# Patient Record
Sex: FEMALE | Race: WHITE | NOT HISPANIC OR LATINO | Employment: STUDENT | URBAN - METROPOLITAN AREA
[De-identification: names, ages, dates, MRNs, and addresses within clinical notes are randomized per-mention and may not be internally consistent; named-entity substitution may affect disease eponyms.]

---

## 2018-09-05 ENCOUNTER — OFFICE VISIT (OUTPATIENT)
Dept: NEUROLOGY | Facility: CLINIC | Age: 28
End: 2018-09-05
Payer: OTHER GOVERNMENT

## 2018-09-05 VITALS
HEART RATE: 72 BPM | SYSTOLIC BLOOD PRESSURE: 110 MMHG | BODY MASS INDEX: 26.51 KG/M2 | DIASTOLIC BLOOD PRESSURE: 70 MMHG | HEIGHT: 71 IN | WEIGHT: 189.38 LBS

## 2018-09-05 DIAGNOSIS — F43.20 ADJUSTMENT DISORDER, UNSPECIFIED TYPE: ICD-10-CM

## 2018-09-05 DIAGNOSIS — G43.009 MIGRAINE WITHOUT AURA AND WITHOUT STATUS MIGRAINOSUS, NOT INTRACTABLE: Primary | ICD-10-CM

## 2018-09-05 PROCEDURE — 99999 PR PBB SHADOW E&M-EST. PATIENT-LVL IV: CPT | Mod: PBBFAC,,, | Performed by: NURSE PRACTITIONER

## 2018-09-05 PROCEDURE — 99214 OFFICE O/P EST MOD 30 MIN: CPT | Mod: S$PBB,,, | Performed by: NURSE PRACTITIONER

## 2018-09-05 PROCEDURE — 99214 OFFICE O/P EST MOD 30 MIN: CPT | Mod: PBBFAC | Performed by: NURSE PRACTITIONER

## 2018-09-05 RX ORDER — VERAPAMIL HYDROCHLORIDE 40 MG/1
40 TABLET ORAL DAILY
COMMUNITY
End: 2018-09-05 | Stop reason: ALTCHOICE

## 2018-09-05 RX ORDER — RIZATRIPTAN BENZOATE 5 MG/1
5 TABLET ORAL
COMMUNITY
End: 2019-01-16

## 2018-09-05 RX ORDER — ESCITALOPRAM OXALATE 10 MG/1
10 TABLET ORAL DAILY
Qty: 30 TABLET | Refills: 2 | Status: SHIPPED | OUTPATIENT
Start: 2018-09-05 | End: 2018-10-02

## 2018-09-05 RX ORDER — RIZATRIPTAN BENZOATE 5 MG/1
5 TABLET, ORALLY DISINTEGRATING ORAL
Qty: 9 TABLET | Refills: 5 | Status: SHIPPED | OUTPATIENT
Start: 2018-09-05 | End: 2018-10-05

## 2018-09-05 NOTE — PROGRESS NOTES
"Ailyn Sapp returns today for mgt of headaches. Last seen 11/15/16    HISTORY  9/27/16:   REASON FOR CONSULT: Headaches     26/F with chronic headaches since HS (16). They were infrequent then and treated effectively with Advil prn. Since age 19 they worsened in intensity and frequency. She was seen at [facility] (lived in [place]) at that time and was given 5 prescriptions but she never began any. Headaches (HA) were more frequent prior to pregnancy ~ age 23. They resolved and so did sinus congestion throughout pregnancy then 9-mos after delivery they returned again, but not as frequent as they once were. She will occasionally awaken with pain. HA are gradual or at times abrupt onset if triggered by strong scent. Bright lights, stress and menses other triggers. Describes as R or left sided posterior area radiating bitemporally, pulsatile sensation "when bad with ping or zing pain" with associated nausea, floaters, photo/phonophobia and dec'd appetite. She has had visual auras(zigzag lines) in distant past. Awoke 8/30/16 with worse head pain ever like head was going to explode/excuciating along with fever/chills/sweating. Other symptoms improved but headache lingered for 4-5 d. She was not taking any OTC analgesics. CT scan head essentially unremarkable and labs nl except elevated liver enzymes. Since then, headache have occurred ~ 1-2x/week, mild-moderate, which is typical frequency. She denies acute neurological or unilateral autonomic deficit, dizziness, or prodromal symptoms. Activity can worsen pain, rest/dark room/cold compress improve. HIT-6= 68    Never been on preventive therapy or tried RX abortives except recent Fioricet (found it inc'd headaches during her recent severe episode 4-5 d)      INTERVAL HISTORY:  11/15/16: Since last seen, headaches have lessened in frequency, now about 1 q 2 wks. Imitrex 100mg 1/2 tab very effective w/i 30-40min. Has been taking topamax 15mg only prn, not daily. " "Definitely helping with anxiety (witnessed code in clinical last week). But still feels wired and very anxious inside. Moving to [place] and graduating in 3 wks. HIT-6 = 55  Interim: blepharitis, on doxy    9/5/18:   She stopped topamax ~ mid 2017 when she established care with neurologist in [place]. Began Gabapentin 300mg bid but stopped this spring '18 due to 30# gain. She is now taking verapamil 40mg qd and reports HA occur 1-2x/month. She does not like being on a BP med. Her anxiety is biggest reason for visit today. 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  deployed and now living with in-laws here in [place]. She has felt R sided CP 4x in last 3mos she attributes to anxiety. She takes Mag 250mg qhs. Maxalt ODT 5mg and Zofran prn are effective. HA remains same location/nature as above (9/27/16).     TRIED( cymbalta, topamax, verapamil, gabapentin)      FH: negative for headache  SH:Cardiovascular sonography/hoping to begin at [facility]        ROS: In addition to above. Otherwise a balance review of 10-systems is negative.       PHYSICAL EXAM:   General: W/D, obese, well groomed  /70   Pulse 72   Ht 5' 11" (1.803 m)   Wt 85.9 kg (189 lb 6 oz)   BMI 26.41 kg/m²   Neurological: Awake, alert, oriented x 3. Speech fluent, no dysarthria. Good attention span. Good fund of knowledge.   CN II-XII- pupils equal, no ptosis, nystagmus, EOM palsy, papilledema. No facial asymmetry or facial sensory loss. Good hearing bilaterally. Good shoulder shrug, palatal elevation, tongue protrusion bilaterally.   Motor: 5/5 strength, normal tone/bulk in all extremities.   Sensory: Intact to light touch upper and lower extremities. No occipital or paracervical muscle tenderness.   Gait: Normal   Coordination: Good FTNT, Heel to shin         IMPRESSION:   Chronic migraines with and w/o aura-stable  Anxiety      PLAN   Stop verapamil (if worsen " consider Aimovig- discussed today)  REFER to psychiatry  Begin lexapro 10mg qd (discussed s/e, if sedating then take qhs)  Continue meditation/deep breathing and mindfulness exercises and healthy diet    Continue maxalt ODT 5mg q2h prn  Continue zofran prn HA or nausea    RTC 3-mos, sooner if needed.

## 2018-09-05 NOTE — PATIENT INSTRUCTIONS
Treating Anxiety Disorders with Medicine  An anxiety disorder can make you feel nervous or apprehensive, even without a clear reason. In people age 65 and older, generalized anxiety disorder is one of the most commonly diagnosed anxiety disorders. Many times it occurs with depression. Certain anxiety disorders can cause intense feelings of fear or panic. You may even have physical symptoms such as a racing heartbeat, sweating, or dizziness. If you have these feelings, you dont have to suffer anymore. Treatment to help you overcome your fears will likely include therapy (also called counseling). Medicine may also be prescribed to help control your symptoms.    Medicines  Certain medicines may be prescribed to help control your symptoms. So you may feel less anxious. You may also feel able to move forward with therapy. At first, medicines and dosages may need to be adjusted to find what works best for you. Try to be patient. Tell your healthcare provider how a medicine makes you feel. This way, you can work together to find the treatment thats best for you. Keep in mind that medicines can have side effects. Talk with your provider about any side effects that are bothering you. Changing the dose or type of medicine may help. Dont stop taking medicine on your own. That can cause symptoms to come back.  · Anti-anxiety medicine. This medicine eases symptoms and helps you relax. Your healthcare provider will explain when and how to use it. It may be prescribed for use before situations that make you anxious. You may also be told to take medicine on a regular schedule. Anti-anxiety medicine may make you feel a little sleepy or out of it. Dont drive a car or operate machinery while on this medicine, until you know how it affects you.  Caution  Never use alcohol or other drugs with anti-anxiety medicines. This could result in loss of muscular control, sedation, coma, or death. Also, use only the amount of medicine  prescribed for you. If you think you may have taken too much, get emergency care right away.   · Antidepressant medicine. This kind of medicine is often used to treat anxiety, even if you arent depressed. An antidepressant helps balance out brain chemicals. This helps keep anxiety under control. This medicine is taken on a schedule. It takes a few weeks to start working. If you dont notice a change at first, you may just need more time. But if you dont notice results after the first few weeks, tell your provider.  Keep taking medicines as prescribed  Never change your dosage, share or use another person's medicine, or stop taking your medicines without talking to your healthcare provider first. Keep the following in mind:  · Some medicines must be taken on a schedule. Make this part of your daily routine. For instance, always take your pill before brushing your teeth. A pillbox can help you remember if youve taken your medicine each day.  · Medicines are often taken for 6 to 12 months. Your healthcare provider will then evaluate whether you need to stay on them. Many people who have also had therapy may no longer need medicine to manage anxiety.  · You may need to stop taking medicine slowly to give your body time to adjust. When its time to stop, your healthcare provider will tell you more. Remember: Never stop taking your medicine without talking to your provider first.  · If symptoms return, you may need to start taking medicines again. This isnt your fault. Its just the nature of your anxiety disorder.  Special concerns  · Side effects. Medicines may cause side effects. Ask your healthcare provider or pharmacist what you can expect. They may have ideas for avoiding some side effects.  · Sexual problems. Some antidepressants can affect your desire for sex or your ability to have an orgasm. A change in dosage or medicine often solves the problem. If you have a sexual side effect that concerns you, tell your  healthcare provider.  · Addiction. If youve never had a problem with drugs or alcohol, you may not have a problem with medicines used to treat anxiety disorders. But always discuss the medicines with your healthcare provider before taking them. If you have a history of addiction, you may not be able to use certain medicines used to treat anxiety disorders.  · Medicine interactions. Always check with your pharmacist before using any over-the-counter medicines, including herbal supplements.   Date Last Reviewed: 5/1/2017 © 2000-2017 [employer]. [address] [place] [address]. All rights reserved. This information is not intended as a substitute for professional medical care. Always follow your healthcare professional's instructions.        Understanding Generalized Anxiety Disorder (WINDY)  Anxiety can fill you with worry and fear. Sometimes anxiety is healthy. It alerts you to a potential threat and gets you to respond and take action. But for some people, anxiety gets so bad it causes problems in daily life. If you find yourself in a constant state of anxiety, you may have an anxiety disorder called generalized anxiety disorder (WINDY). Speak with your healthcare provider or mental health professional to learn more. He or she can help.     What is generalized anxiety disorder?  With WINDY, you might worry about money, your family and friends, work, or the world in general. You might not even be sure what you're anxious about. But whatever it is, you have an intense fear that the worst will happen. These feelings never really go away. In people age 65 and older, WINDY is one of the most commonly diagnosed anxiety disorders.  Many times it occurs with depression. This constant worry affects your quality of life and makes it hard to function. WINDY can cause physical symptoms, too.  What are common symptoms of generalized anxiety disorder?  People with WINDY often think they have a physical illness. The  disorder can cause symptoms, such as:  · Muscle tension, especially in the neck and shoulders  · Nausea and stomach problems  · Frequent headaches  · Feeling lightheaded  · Restlessness, trouble sleeping  · Feeling irritable and on edge all the time  How can generalized anxiety disorder be treated?  WINDY can be treated with medicine or therapy (also called counseling), or both. Medicine helps to reduce symptoms, so you can continue with your daily routine. Therapy helps you understand the cause of your anxiety and learn how to manage it. Both forms of treatment help you deal with problems that anxiety causes in your life. This helps you to be healthier and happier.  Date Last Reviewed: 5/1/2017  © 5338-2968 [employer]. [address] [place] [address], [place] [address]. All rights reserved. This information is not intended as a substitute for professional medical care. Always follow your healthcare professional's instructions.

## 2018-10-02 ENCOUNTER — HOSPITAL ENCOUNTER (OUTPATIENT)
Dept: CARDIOLOGY | Facility: CLINIC | Age: 28
Discharge: HOME | End: 2018-10-02
Payer: OTHER GOVERNMENT

## 2018-10-02 ENCOUNTER — OFFICE VISIT (OUTPATIENT)
Dept: PSYCHIATRY | Facility: CLINIC | Age: 28
End: 2018-10-02
Payer: OTHER GOVERNMENT

## 2018-10-02 VITALS
WEIGHT: 189.13 LBS | HEIGHT: 70 IN | HEART RATE: 66 BPM | SYSTOLIC BLOOD PRESSURE: 113 MMHG | DIASTOLIC BLOOD PRESSURE: 66 MMHG | BODY MASS INDEX: 27.08 KG/M2

## 2018-10-02 DIAGNOSIS — F32.4 MDD (MAJOR DEPRESSIVE DISORDER), SINGLE EPISODE, IN PARTIAL REMISSION: ICD-10-CM

## 2018-10-02 DIAGNOSIS — F40.10 SOCIAL ANXIETY DISORDER: ICD-10-CM

## 2018-10-02 DIAGNOSIS — F51.05 INSOMNIA DUE TO OTHER MENTAL DISORDER: ICD-10-CM

## 2018-10-02 DIAGNOSIS — F99 INSOMNIA DUE TO OTHER MENTAL DISORDER: ICD-10-CM

## 2018-10-02 DIAGNOSIS — F41.1 GAD (GENERALIZED ANXIETY DISORDER): Primary | ICD-10-CM

## 2018-10-02 DIAGNOSIS — F41.1 GAD (GENERALIZED ANXIETY DISORDER): ICD-10-CM

## 2018-10-02 PROCEDURE — 93005 ELECTROCARDIOGRAM TRACING: CPT | Mod: PBBFAC | Performed by: INTERNAL MEDICINE

## 2018-10-02 PROCEDURE — 99203 OFFICE O/P NEW LOW 30 MIN: CPT | Mod: S$PBB,,, | Performed by: STUDENT IN AN ORGANIZED HEALTH CARE EDUCATION/TRAINING PROGRAM

## 2018-10-02 PROCEDURE — 93010 ELECTROCARDIOGRAM REPORT: CPT | Mod: S$PBB,,, | Performed by: INTERNAL MEDICINE

## 2018-10-02 PROCEDURE — 99212 OFFICE O/P EST SF 10 MIN: CPT | Mod: PBBFAC | Performed by: STUDENT IN AN ORGANIZED HEALTH CARE EDUCATION/TRAINING PROGRAM

## 2018-10-02 PROCEDURE — 99999 PR PBB SHADOW E&M-EST. PATIENT-LVL II: CPT | Mod: PBBFAC,,, | Performed by: STUDENT IN AN ORGANIZED HEALTH CARE EDUCATION/TRAINING PROGRAM

## 2018-10-02 RX ORDER — ESCITALOPRAM OXALATE 20 MG/1
20 TABLET ORAL DAILY
Qty: 30 TABLET | Refills: 2 | Status: SHIPPED | OUTPATIENT
Start: 2018-10-02 | End: 2018-11-07

## 2018-10-02 RX ORDER — DOXEPIN HYDROCHLORIDE 10 MG/1
10 CAPSULE ORAL NIGHTLY PRN
Qty: 30 CAPSULE | Refills: 2 | Status: SHIPPED | OUTPATIENT
Start: 2018-10-02 | End: 2018-10-24

## 2018-10-02 RX ORDER — HYDROXYZINE PAMOATE 25 MG/1
CAPSULE ORAL
Qty: 30 CAPSULE | Refills: 2 | Status: SHIPPED | OUTPATIENT
Start: 2018-10-02 | End: 2019-01-09 | Stop reason: SDUPTHER
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
## 2018-10-03 ENCOUNTER — PATIENT MESSAGE (OUTPATIENT)
Dept: PSYCHIATRY | Facility: CLINIC | Age: 28
End: 2018-10-03

## 2018-10-12 NOTE — PROGRESS NOTES
Subjective:       Patient ID: Ailyn Sapp is a 28 y.o. female.    Chief Complaint: Fatigue    Patient is a 28 y.o.female who presents today for annual. Has been feeling sluggish.    Having a weight problem. Gaining weight despite exercising twice per day and dieting.    Cholesterol: due now  Vaccines: up to date  Gyn exam: due now  Exercise: she does crossfit 3-4 days per week and spinning 5-6.   Diet: strict; no soda, no fast food, high protein, low fat diet      Two months ago; got established with a psychiatrist. She is currently taking lexapro.     Hasn't taken vitamin D since July. She was taking twice a week vitamin D 50,000 units.   Past Medical History:   Diagnosis Date    Abnormal Pap smear of cervix     Migraines      Past Surgical History:   Procedure Laterality Date    CHOLECYSTECTOMY      diagnostic laparoscopy      laproscopy N/A     2011    STRABISMUS SURGERY Bilateral      Social History     Socioeconomic History    Marital status:      Spouse name: Not on file    Number of children: Not on file    Years of education: Not on file    Highest education level: Not on file   Social Needs    Financial resource strain: Not on file    Food insecurity - worry: Not on file    Food insecurity - inability: Not on file    Transportation needs - medical: Not on file    Transportation needs - non-medical: Not on file   Occupational History    Not on file   Tobacco Use    Smoking status: Never Smoker    Smokeless tobacco: Never Used   Substance and Sexual Activity    Alcohol use: No     Frequency: Never    Drug use: No    Sexual activity: Yes     Partners: Male   Other Topics Concern    Not on file   Social History Narrative    Not on file     Review of patient's allergies indicates:   Allergen Reactions    Ofloxacin Itching     Eye drops produced allergic conjunctivitis.     Ailyn Sapp had no medications administered during this visit.    Review of Systems    Constitutional: Negative for appetite change, chills, diaphoresis, fatigue and fever.   HENT: Negative for congestion, dental problem, ear discharge, ear pain, hearing loss, postnasal drip, sinus pressure and sore throat.    Eyes: Negative for discharge, redness and itching.   Respiratory: Negative for cough, chest tightness, shortness of breath and wheezing.    Cardiovascular: Negative for chest pain, palpitations and leg swelling.   Gastrointestinal: Negative for abdominal pain, constipation, diarrhea, nausea and vomiting.   Endocrine: Negative for cold intolerance and heat intolerance.   Genitourinary: Negative for difficulty urinating, frequency, hematuria and urgency.   Musculoskeletal: Negative for arthralgias, back pain, gait problem, myalgias and neck pain.   Skin: Negative for color change and rash.   Neurological: Negative for dizziness, syncope and headaches.   Hematological: Negative for adenopathy.   Psychiatric/Behavioral: Negative for behavioral problems and sleep disturbance. The patient is not nervous/anxious.        Objective:      Physical Exam   Constitutional: She is oriented to person, place, and time. She appears well-developed and well-nourished. No distress.   HENT:   Head: Normocephalic and atraumatic.   Right Ear: External ear normal.   Left Ear: External ear normal.   Nose: Nose normal.   Mouth/Throat: Oropharynx is clear and moist. No oropharyngeal exudate.   Eyes: Conjunctivae and EOM are normal. Pupils are equal, round, and reactive to light. Right eye exhibits no discharge. Left eye exhibits no discharge. No scleral icterus.   Neck: Normal range of motion. Neck supple. No JVD present. No thyromegaly present.   Cardiovascular: Normal rate, regular rhythm, normal heart sounds and intact distal pulses. Exam reveals no gallop and no friction rub.   No murmur heard.  Pulmonary/Chest: Effort normal and breath sounds normal. No stridor. No respiratory distress. She has no wheezes. She has  no rales. She exhibits no tenderness.   Abdominal: Soft. Bowel sounds are normal. She exhibits no distension. There is no tenderness. There is no rebound.   Musculoskeletal: Normal range of motion. She exhibits no edema or tenderness.   Lymphadenopathy:     She has no cervical adenopathy.   Neurological: She is alert and oriented to person, place, and time. No cranial nerve deficit.   Skin: Skin is warm and dry. No rash noted. She is not diaphoretic. No erythema.   Psychiatric: She has a normal mood and affect. Her behavior is normal.   Nursing note and vitals reviewed.      Assessment and Plan:       1. Annual physical exam  - CBC auto differential; Future  - Comprehensive metabolic panel; Future  - Lipid panel; Future  - Vitamin D; Future  - Urinalysis; Future  - TSH; Future  - THYROID PEROXIDASE ANTIBODY; Future  - T4, free; Future    2. Vitamin D deficiency  - check vit D    3. Elevated LFTs  - check cmp    4. Overweight (BMI 25.0-29.9)  - Ambulatory Referral to Bariatric Medicine          No Follow-up on file.

## 2018-10-16 ENCOUNTER — PATIENT MESSAGE (OUTPATIENT)
Dept: PSYCHIATRY | Facility: CLINIC | Age: 28
End: 2018-10-16

## 2018-10-23 ENCOUNTER — PATIENT MESSAGE (OUTPATIENT)
Dept: PSYCHIATRY | Facility: CLINIC | Age: 28
End: 2018-10-23

## 2018-10-24 RX ORDER — DOXEPIN HYDROCHLORIDE 10 MG/1
20 CAPSULE ORAL NIGHTLY PRN
Qty: 60 CAPSULE | Refills: 2 | Status: SHIPPED | OUTPATIENT
Start: 2018-10-24 | End: 2018-11-07

## 2018-10-24 NOTE — TELEPHONE ENCOUNTER
Pt doing better with doxepin 20mg nightly, needs refill so as not to run out.   Doxepin 10mg caps #60, 2 refills, sent to pharmacy.

## 2018-10-26 ENCOUNTER — OFFICE VISIT (OUTPATIENT)
Dept: INTERNAL MEDICINE | Facility: CLINIC | Age: 28
End: 2018-10-26
Payer: OTHER GOVERNMENT

## 2018-10-26 VITALS
SYSTOLIC BLOOD PRESSURE: 113 MMHG | DIASTOLIC BLOOD PRESSURE: 75 MMHG | WEIGHT: 198.44 LBS | RESPIRATION RATE: 16 BRPM | HEIGHT: 70 IN | BODY MASS INDEX: 28.41 KG/M2 | TEMPERATURE: 99 F | HEART RATE: 87 BPM

## 2018-10-26 DIAGNOSIS — Z00.00 ANNUAL PHYSICAL EXAM: Primary | ICD-10-CM

## 2018-10-26 DIAGNOSIS — E66.3 OVERWEIGHT (BMI 25.0-29.9): ICD-10-CM

## 2018-10-26 DIAGNOSIS — R79.89 ELEVATED LFTS: ICD-10-CM

## 2018-10-26 DIAGNOSIS — E55.9 VITAMIN D DEFICIENCY: ICD-10-CM

## 2018-10-26 PROCEDURE — 99999 PR PBB SHADOW E&M-EST. PATIENT-LVL III: CPT | Mod: PBBFAC,,, | Performed by: INTERNAL MEDICINE

## 2018-10-26 PROCEDURE — 99395 PREV VISIT EST AGE 18-39: CPT | Mod: S$PBB,,, | Performed by: INTERNAL MEDICINE

## 2018-10-26 PROCEDURE — 99213 OFFICE O/P EST LOW 20 MIN: CPT | Mod: PBBFAC,PO | Performed by: INTERNAL MEDICINE

## 2018-10-27 ENCOUNTER — LAB VISIT (OUTPATIENT)
Dept: LAB | Facility: HOSPITAL | Age: 28
End: 2018-10-27
Attending: INTERNAL MEDICINE
Payer: OTHER GOVERNMENT

## 2018-10-27 DIAGNOSIS — Z00.00 ANNUAL PHYSICAL EXAM: ICD-10-CM

## 2018-10-27 LAB
25(OH)D3+25(OH)D2 SERPL-MCNC: 20 NG/ML
ALBUMIN SERPL BCP-MCNC: 4.3 G/DL
ALP SERPL-CCNC: 84 U/L
ALT SERPL W/O P-5'-P-CCNC: 80 U/L
ANION GAP SERPL CALC-SCNC: 8 MMOL/L
AST SERPL-CCNC: 42 U/L
BASOPHILS # BLD AUTO: 0.04 K/UL
BASOPHILS NFR BLD: 0.6 %
BILIRUB SERPL-MCNC: 1 MG/DL
BUN SERPL-MCNC: 10 MG/DL
CALCIUM SERPL-MCNC: 10 MG/DL
CHLORIDE SERPL-SCNC: 106 MMOL/L
CHOLEST SERPL-MCNC: 175 MG/DL
CHOLEST/HDLC SERPL: 5.1 {RATIO}
CO2 SERPL-SCNC: 24 MMOL/L
CREAT SERPL-MCNC: 0.9 MG/DL
DIFFERENTIAL METHOD: NORMAL
EOSINOPHIL # BLD AUTO: 0.2 K/UL
EOSINOPHIL NFR BLD: 2.8 %
ERYTHROCYTE [DISTWIDTH] IN BLOOD BY AUTOMATED COUNT: 13.1 %
EST. GFR  (AFRICAN AMERICAN): >60 ML/MIN/1.73 M^2
EST. GFR  (NON AFRICAN AMERICAN): >60 ML/MIN/1.73 M^2
GLUCOSE SERPL-MCNC: 96 MG/DL
HCT VFR BLD AUTO: 45.8 %
HDLC SERPL-MCNC: 34 MG/DL
HDLC SERPL: 19.4 %
HGB BLD-MCNC: 14.8 G/DL
LDLC SERPL CALC-MCNC: 123.2 MG/DL
LYMPHOCYTES # BLD AUTO: 2 K/UL
LYMPHOCYTES NFR BLD: 29.5 %
MCH RBC QN AUTO: 30.3 PG
MCHC RBC AUTO-ENTMCNC: 32.3 G/DL
MCV RBC AUTO: 94 FL
MONOCYTES # BLD AUTO: 0.5 K/UL
MONOCYTES NFR BLD: 7.9 %
NEUTROPHILS # BLD AUTO: 3.9 K/UL
NEUTROPHILS NFR BLD: 58.9 %
NONHDLC SERPL-MCNC: 141 MG/DL
PLATELET # BLD AUTO: 253 K/UL
PMV BLD AUTO: 10.8 FL
POTASSIUM SERPL-SCNC: 4.5 MMOL/L
PROT SERPL-MCNC: 7.9 G/DL
RBC # BLD AUTO: 4.88 M/UL
SODIUM SERPL-SCNC: 138 MMOL/L
T4 FREE SERPL-MCNC: 0.88 NG/DL
TRIGL SERPL-MCNC: 89 MG/DL
TSH SERPL DL<=0.005 MIU/L-ACNC: 1.27 UIU/ML
WBC # BLD AUTO: 6.67 K/UL

## 2018-10-27 PROCEDURE — 85025 COMPLETE CBC W/AUTO DIFF WBC: CPT

## 2018-10-27 PROCEDURE — 80061 LIPID PANEL: CPT

## 2018-10-27 PROCEDURE — 80053 COMPREHEN METABOLIC PANEL: CPT

## 2018-10-27 PROCEDURE — 84443 ASSAY THYROID STIM HORMONE: CPT

## 2018-10-27 PROCEDURE — 36415 COLL VENOUS BLD VENIPUNCTURE: CPT

## 2018-10-27 PROCEDURE — 86376 MICROSOMAL ANTIBODY EACH: CPT

## 2018-10-27 PROCEDURE — 84439 ASSAY OF FREE THYROXINE: CPT

## 2018-10-27 PROCEDURE — 82306 VITAMIN D 25 HYDROXY: CPT

## 2018-10-28 ENCOUNTER — PATIENT MESSAGE (OUTPATIENT)
Dept: INTERNAL MEDICINE | Facility: CLINIC | Age: 28
End: 2018-10-28

## 2018-10-29 LAB — THYROPEROXIDASE IGG SERPL-ACNC: <6 IU/ML

## 2018-10-30 ENCOUNTER — PATIENT MESSAGE (OUTPATIENT)
Dept: INTERNAL MEDICINE | Facility: CLINIC | Age: 28
End: 2018-10-30

## 2018-11-07 ENCOUNTER — OFFICE VISIT (OUTPATIENT)
Dept: PSYCHIATRY | Facility: CLINIC | Age: 28
End: 2018-11-07
Payer: OTHER GOVERNMENT

## 2018-11-07 VITALS
HEART RATE: 91 BPM | WEIGHT: 198.75 LBS | BODY MASS INDEX: 28.45 KG/M2 | SYSTOLIC BLOOD PRESSURE: 124 MMHG | DIASTOLIC BLOOD PRESSURE: 66 MMHG | HEIGHT: 70 IN

## 2018-11-07 DIAGNOSIS — F51.05 INSOMNIA DUE TO OTHER MENTAL DISORDER: ICD-10-CM

## 2018-11-07 DIAGNOSIS — F40.10 SOCIAL ANXIETY DISORDER: ICD-10-CM

## 2018-11-07 DIAGNOSIS — F32.4 MDD (MAJOR DEPRESSIVE DISORDER), SINGLE EPISODE, IN PARTIAL REMISSION: ICD-10-CM

## 2018-11-07 DIAGNOSIS — F99 INSOMNIA DUE TO OTHER MENTAL DISORDER: ICD-10-CM

## 2018-11-07 DIAGNOSIS — F41.1 GAD (GENERALIZED ANXIETY DISORDER): Primary | ICD-10-CM

## 2018-11-07 PROCEDURE — 99213 OFFICE O/P EST LOW 20 MIN: CPT | Mod: S$PBB,,, | Performed by: STUDENT IN AN ORGANIZED HEALTH CARE EDUCATION/TRAINING PROGRAM

## 2018-11-07 PROCEDURE — 99999 PR PBB SHADOW E&M-EST. PATIENT-LVL II: CPT | Mod: PBBFAC,,, | Performed by: STUDENT IN AN ORGANIZED HEALTH CARE EDUCATION/TRAINING PROGRAM

## 2018-11-07 PROCEDURE — 99212 OFFICE O/P EST SF 10 MIN: CPT | Mod: PBBFAC | Performed by: STUDENT IN AN ORGANIZED HEALTH CARE EDUCATION/TRAINING PROGRAM

## 2018-11-07 RX ORDER — DOXEPIN HYDROCHLORIDE 10 MG/1
20 CAPSULE ORAL NIGHTLY PRN
Qty: 60 CAPSULE | Refills: 2 | Status: SHIPPED | OUTPATIENT
Start: 2018-11-07 | End: 2019-01-09

## 2018-11-07 RX ORDER — ESCITALOPRAM OXALATE 20 MG/1
20 TABLET ORAL DAILY
Qty: 30 TABLET | Refills: 2 | Status: SHIPPED | OUTPATIENT
Start: 2018-11-07 | End: 2019-01-09

## 2018-11-07 NOTE — PROGRESS NOTES
"Outpatient Psychiatry Initial Visit (MD/NP)    11/7/2018    Ailyn Sapp, a 28 y.o. female, presenting for follow up visit. Met with patient.    Reason for Encounter: Referral from Neurology. Patient complains of Anxiety.    Current medications  -Lexapro 20mg daily  -Doxepin 20mg nightly  -magnesium supplement      History of Present Illness:   TODAY patient reports she is doing better than before. 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 She is talking more with her in laws at home and feels more engaged. She feels "like I have a purpose." The externship is open ended and hopes it will last at least through the end of the year or longer. Hopes it might lead to a paying job.     She has found a PCP she really likes. Sleep has improved overall. In past few days has been getting "really good" sleep, fell asleep easily, dreamed, slept 7h, and awoke with her alarm. Notes she sleeps better on days she works and feels she has earned her rest. Gets between 5 and 7 hours each night. Appetite is good. Mood is "great, fantastic" with no SI/HI/AVH. Anxiety is better controlled on Lexapro 20mg. Has had two panic attacks since last visit, Vistaril helped a lot. Full ROS below    A main concern is weight gain. She originally 35lbs on gabapentin, from January to early April. She also started a hormone pill in December 2017 for endometriosis, which contributed. She stopped this last week. She has gained another 9lbs since early September unsure if this represents continued weight gain from the hormone, or due to starting Doxepin.             Review Of Systems:       General:  Denies weight loss, fever, chills, weakness, fatigue  HEENT:  Denies vision changes, sore throat, congestion  CVS:  Denies chest pain, pressure, palpitations a this time  Resp:  Denies shortness of breath, currently  GI:  Denies diarrhea, constipation, abdominal pain, nausea, vomiting  :  Denies " "dysuria, hematuria, nocturia  MSK:  Denies myalgias, arthralgias  Skin:  Denies rashes, bleeding  Neuro:  Denies headache, dizziness, syncope, numbness, paresthesias  All other systems otherwise negative    Psychiatric ROS-- is patient having problems with:  Sleep: 5-7h/sleep per night.  appetite: up  weight: yes, gained 9lbs since early september  Energy/anergy improved  interest/pleasure/anhedonia: improved  somatic symptoms: no  libido: improved since stopped taking hormone pill  anxiety/panic: yes, but improved, 2 panic only  guilty/hopelessness: no  concentration: better  S.I.B.s/risky behavior: no  Irritability: better  Racing thoughts:better  Impulsive behaviors: no  Paranoia:no  AVH:no      Current Evaluation:     Nutritional Screening: Considering the patient's height and weight, medications, medical history and preferences, should a referral be made to the dietitian? no    Constitutional  Vitals:  Most recent vital signs, dated less than 90 days prior to this appointment, were reviewed.    Vitals:    11/07/18 1352   BP: 124/66   Pulse: 91   Weight: 90.2 kg (198 lb 11.9 oz)   Height: 5' 10" (1.778 m)        General:  unremarkable, age appropriate     Musculoskeletal  Muscle Strength/Tone:  no spasicity, no tremor   Gait & Station:  non-ataxic     Psychiatric  Speech:  no latency; no press   Mood & Affect:  anxious  congruent and appropriate   Thought Process:  normal and logical   Associations:  intact   Thought Content:  normal, no suicidality, no homicidality, delusions, or paranoia   Insight:  intact, has awareness of illness   Judgement: behavior is adequate to circumstances   Orientation:  grossly intact   Memory: intact for content of interview   Language: grossly intact   Attention Span & Concentration:  able to focus   Fund of Knowledge:  intact and appropriate to age and level of education       Relevant Elements of Neurological Exam: normal gait    Functioning in Relationships:  Spouse/partner: " generally good. He is abroad with the army. They have had some marital problems in the past (he texted/sent photos to another woman) but they have been through counseling for this. He is supportive but not physically present right now  Family: finds her own mother has difficulty accepting son's autism diagnosis but in-laws are both understanding and helpful/supportive  Employers: not working    Laboratory Data  Lab Visit on 10/27/2018   Component Date Value Ref Range Status    WBC 10/27/2018 6.67  3.90 - 12.70 K/uL Final    RBC 10/27/2018 4.88  4.00 - 5.40 M/uL Final    Hemoglobin 10/27/2018 14.8  12.0 - 16.0 g/dL Final    Hematocrit 10/27/2018 45.8  37.0 - 48.5 % Final    MCV 10/27/2018 94  82 - 98 fL Final    MCH 10/27/2018 30.3  27.0 - 31.0 pg Final    MCHC 10/27/2018 32.3  32.0 - 36.0 g/dL Final    RDW 10/27/2018 13.1  11.5 - 14.5 % Final    Platelets 10/27/2018 253  150 - 350 K/uL Final    MPV 10/27/2018 10.8  9.2 - 12.9 fL Final    Gran # (ANC) 10/27/2018 3.9  1.8 - 7.7 K/uL Final    Lymph # 10/27/2018 2.0  1.0 - 4.8 K/uL Final    Mono # 10/27/2018 0.5  0.3 - 1.0 K/uL Final    Eos # 10/27/2018 0.2  0.0 - 0.5 K/uL Final    Baso # 10/27/2018 0.04  0.00 - 0.20 K/uL Final    Gran% 10/27/2018 58.9  38.0 - 73.0 % Final    Lymph% 10/27/2018 29.5  18.0 - 48.0 % Final    Mono% 10/27/2018 7.9  4.0 - 15.0 % Final    Eosinophil% 10/27/2018 2.8  0.0 - 8.0 % Final    Basophil% 10/27/2018 0.6  0.0 - 1.9 % Final    Differential Method 10/27/2018 Automated   Final    Sodium 10/27/2018 138  136 - 145 mmol/L Final    Potassium 10/27/2018 4.5  3.5 - 5.1 mmol/L Final    Chloride 10/27/2018 106  95 - 110 mmol/L Final    CO2 10/27/2018 24  23 - 29 mmol/L Final    Glucose 10/27/2018 96  70 - 110 mg/dL Final    BUN, Bld 10/27/2018 10  6 - 20 mg/dL Final    Creatinine 10/27/2018 0.9  0.5 - 1.4 mg/dL Final    Calcium 10/27/2018 10.0  8.7 - 10.5 mg/dL Final    Total Protein 10/27/2018 7.9  6.0 - 8.4 g/dL  Final    Albumin 10/27/2018 4.3  3.5 - 5.2 g/dL Final    Total Bilirubin 10/27/2018 1.0  0.1 - 1.0 mg/dL Final    Alkaline Phosphatase 10/27/2018 84  55 - 135 U/L Final    AST 10/27/2018 42* 10 - 40 U/L Final    ALT 10/27/2018 80* 10 - 44 U/L Final    Anion Gap 10/27/2018 8  8 - 16 mmol/L Final    eGFR if African American 10/27/2018 >60  >60 mL/min/1.73 m^2 Final    eGFR if non African American 10/27/2018 >60  >60 mL/min/1.73 m^2 Final    Cholesterol 10/27/2018 175  120 - 199 mg/dL Final    Triglycerides 10/27/2018 89  30 - 150 mg/dL Final    HDL 10/27/2018 34* 40 - 75 mg/dL Final    LDL Cholesterol 10/27/2018 123.2  63.0 - 159.0 mg/dL Final    HDL/Chol Ratio 10/27/2018 19.4* 20.0 - 50.0 % Final    Total Cholesterol/HDL Ratio 10/27/2018 5.1* 2.0 - 5.0 Final    Non-HDL Cholesterol 10/27/2018 141  mg/dL Final    Vit D, 25-Hydroxy 10/27/2018 20* 30 - 96 ng/mL Final    TSH 10/27/2018 1.272  0.400 - 4.000 uIU/mL Final    Thyroperoxidase Antibodies 10/27/2018 <6.0  <6.0 IU/mL Final    Free T4 10/27/2018 0.88  0.71 - 1.51 ng/dL Final   Lab Visit on 10/27/2018   Component Date Value Ref Range Status    Specimen UA 10/27/2018 Urine, Unspecified   Final    Color, UA 10/27/2018 Yellow  Yellow, Straw, [place] Final    Appearance, UA 10/27/2018 Clear  Clear Final    pH, UA 10/27/2018 5.0  5.0 - 8.0 Final    Specific Gravity, UA 10/27/2018 1.020  1.005 - 1.030 Final    Protein, UA 10/27/2018 Negative  Negative Final    Glucose, UA 10/27/2018 Negative  Negative Final    Ketones, UA 10/27/2018 Negative  Negative Final    Bilirubin (UA) 10/27/2018 Negative  Negative Final    Occult Blood UA 10/27/2018 Negative  Negative Final    Nitrite, UA 10/27/2018 Negative  Negative Final    Leukocytes, UA 10/27/2018 2+* Negative Final    RBC, UA 10/27/2018 1  0 - 4 /hpf Final    WBC, UA 10/27/2018 4  0 - 5 /hpf Final    Bacteria, UA 10/27/2018 Rare  None-Occ /hpf Final    Squam Epithel, UA 10/27/2018 2  /hpf  Final    Microscopic Comment 10/27/2018 SEE COMMENT   Final         Medications  Outpatient Encounter Medications as of 11/7/2018   Medication Sig Dispense Refill    dicyclomine (BENTYL) 20 mg tablet Take 1 tablet (20 mg total) by mouth 2 (two) times daily as needed. 30 tablet 11    doxepin (SINEQUAN) 10 MG capsule Take 2 capsules (20 mg total) by mouth nightly as needed (insomnia). 60 capsule 2    escitalopram oxalate (LEXAPRO) 20 MG tablet Take 1 tablet (20 mg total) by mouth once daily. 30 tablet 2    flu vac [id number] 36mos up,PF, 60 mcg (15 mcg x 4)/0.5 mL Syrg TO BE ADMIN BY [facility] 0.5 mL 0    hydrOXYzine pamoate (VISTARIL) 25 MG Cap Take 1-2 capsules daily as needed for panic 30 capsule 2    norethindrone (AYGESTIN) 5 mg Tab Take 1 tablet (5 mg total) by mouth once daily. 90 tablet 3    ondansetron (ZOFRAN-ODT) 4 MG TbDL Take 1 to 2 tablets by mouth every 12 hours as needed for nausea. 120 tablet 2    pantoprazole (PROTONIX) 40 MG tablet Take 1 tablet (40 mg total) by mouth 2 (two) times daily. 60 tablet 11    rizatriptan (MAXALT) 5 MG tablet Take 5 mg by mouth as needed for Migraine.      [DISCONTINUED] doxepin (SINEQUAN) 10 MG capsule Take 2 capsules (20 mg total) by mouth nightly as needed (insomnia). 60 capsule 2    [DISCONTINUED] DULoxetine (CYMBALTA) 60 MG capsule Take 1 capsule (60 mg total) by mouth once daily. 30 capsule 11    [DISCONTINUED] escitalopram oxalate (LEXAPRO) 20 MG tablet Take 1 tablet (20 mg total) by mouth once daily. 30 tablet 2    [DISCONTINUED] gabapentin (NEURONTIN) 300 MG capsule Take 1 capsule by mouth at bedtime for 1 week, then 2 capsules by mouth at bedtime. 60 capsule 4    [DISCONTINUED] guanFACINE (TENEX) 1 MG Tab Take 1 tablet (1 mg total) by mouth once daily. 30 tablet 3     No facility-administered encounter medications on file as of 11/7/2018.            Assessment - Diagnosis - Goals:     Impression: Ms Ailyn Sapp is a 28 y.o. female with a history  of depression, anxiety, and insomnia. Depression, anxiety, and insomnia are well controlled today but patient has elevated weight and other elevated markers; will need to monitor closely for medical SEs of medication.       ICD-10-CM ICD-9-CM   1. WINDY (generalized anxiety disorder) F41.1 300.02   2. Social anxiety disorder F40.10 300.23   3. MDD (major depressive disorder), single episode, in partial remission F32.4 296.25   4. Insomnia due to other mental disorder F51.05 300.9    F99 327.02   O    Strengths and Liabilities: Strength: Patient accepts guidance/feedback, Strength: Patient is expressive/articulate., Strength: Patient is intelligent., Strength: Patient is motivated for change., Strength: Patient is physically healthy., Strength: Patient has positive support network., Strength: Patient has reasonable judgment.    Treatment Plan/Recommendations:   · Medication Management: The risks and benefits of medication were discussed with the patient.  -Continue Lexapro to 20mg. If not improved after 4-6 weeks likely consider switch to Zoloft  -Continue Doxepin 20mg nightly for sleep.    -will continue FOR NOW. If weight has increased by next visit WILL NEED TO STOP (pt with increasing weight, cholesterol, relative BP) but this may be related to the hormone medication she has just stopped   -may consider repeat LFTs next visit as well to ensure Doxepin not worsening elevated LFTs  -Continue Vistaril 25-50mg daily PRN for panic attacks. Will not drive when taking.     Discussed risks and benefits of all medications, including but not limited to sedation, serotonin syndrome, hypotension, HA, GI sx, arrhythmia, others.    Monitoring   -most recent labs unremarkable (2016, repeat if symptoms warrant)  -baseline EKG 10/2/18 with sinus val, otherwise normal. QTc 424.   -LFTs very slightly elevated, but totally unchanged from 2 years ago. Will continue to follow up with PCP, including ultrasound  -stopped BC pill but  aware she must use contraception due to medications    Return to Clinic: 2 months      Total time: 30 min

## 2018-11-08 ENCOUNTER — PATIENT MESSAGE (OUTPATIENT)
Dept: INTERNAL MEDICINE | Facility: CLINIC | Age: 28
End: 2018-11-08

## 2018-11-08 DIAGNOSIS — R79.89 ELEVATED LFTS: Primary | ICD-10-CM

## 2018-11-15 ENCOUNTER — HOSPITAL ENCOUNTER (OUTPATIENT)
Dept: RADIOLOGY | Facility: HOSPITAL | Age: 28
Discharge: HOME | End: 2018-11-15
Attending: INTERNAL MEDICINE
Payer: OTHER GOVERNMENT

## 2018-11-15 DIAGNOSIS — R79.89 ELEVATED LFTS: ICD-10-CM

## 2018-11-15 PROCEDURE — 76705 ECHO EXAM OF ABDOMEN: CPT | Mod: 26,,, | Performed by: RADIOLOGY

## 2018-11-15 PROCEDURE — 76705 ECHO EXAM OF ABDOMEN: CPT | Mod: TC

## 2018-12-21 ENCOUNTER — LAB VISIT (OUTPATIENT)
Dept: LAB | Facility: OTHER | Age: 28
End: 2018-12-21
Attending: OBSTETRICS & GYNECOLOGY
Payer: OTHER GOVERNMENT

## 2018-12-21 ENCOUNTER — OFFICE VISIT (OUTPATIENT)
Dept: OBSTETRICS AND GYNECOLOGY | Facility: CLINIC | Age: 28
End: 2018-12-21
Attending: OBSTETRICS & GYNECOLOGY
Payer: OTHER GOVERNMENT

## 2018-12-21 VITALS
HEIGHT: 71 IN | BODY MASS INDEX: 28.45 KG/M2 | DIASTOLIC BLOOD PRESSURE: 82 MMHG | WEIGHT: 203.25 LBS | SYSTOLIC BLOOD PRESSURE: 114 MMHG

## 2018-12-21 DIAGNOSIS — Z01.419 ENCOUNTER FOR GYNECOLOGICAL EXAMINATION WITHOUT ABNORMAL FINDING: ICD-10-CM

## 2018-12-21 DIAGNOSIS — R68.82 LOW LIBIDO: ICD-10-CM

## 2018-12-21 DIAGNOSIS — R37 SEXUAL DYSFUNCTION: ICD-10-CM

## 2018-12-21 DIAGNOSIS — Z12.4 SCREENING FOR MALIGNANT NEOPLASM OF CERVIX: Primary | ICD-10-CM

## 2018-12-21 LAB
DHEA-S SERPL-MCNC: 416 UG/DL
TESTOST SERPL-MCNC: 31 NG/DL

## 2018-12-21 PROCEDURE — 84270 ASSAY OF SEX HORMONE GLOBUL: CPT

## 2018-12-21 PROCEDURE — 88175 CYTOPATH C/V AUTO FLUID REDO: CPT | Performed by: PATHOLOGY

## 2018-12-21 PROCEDURE — 84402 ASSAY OF FREE TESTOSTERONE: CPT

## 2018-12-21 PROCEDURE — 88141 CYTOPATH C/V INTERPRET: CPT | Mod: ,,, | Performed by: PATHOLOGY

## 2018-12-21 PROCEDURE — 99395 PREV VISIT EST AGE 18-39: CPT | Mod: S$GLB,,, | Performed by: OBSTETRICS & GYNECOLOGY

## 2018-12-21 PROCEDURE — 82627 DEHYDROEPIANDROSTERONE: CPT

## 2018-12-21 PROCEDURE — 84403 ASSAY OF TOTAL TESTOSTERONE: CPT

## 2018-12-21 PROCEDURE — 36415 COLL VENOUS BLD VENIPUNCTURE: CPT

## 2018-12-21 NOTE — PROGRESS NOTES
"SUBJECTIVE:   28 y.o. female   for annual routine Pap and checkup. Patient's last menstrual period was 2018..  She has gained 30 pounds after being put on Gabapentin for chronic migraines. She was put on OCPs for endometriosis. She has had issues after weight gain and "I don't like myself." She reports having hormonal issues for a long time. She reports no libido for the last 4 years. She reports that this is not any better off of the OCPs. She reports that this has been an issue since the birth of her son. She can have an orgasm with external stimulation but not with penetrating intercourse. Her  has been deployed - they typically are together  9-10 months out of the year. She has been off the OCPS for about 2 months - no increase in pelvic pain. She is on Lexapro- has been on for 3 months- "has been great." She is now on Phentermine- being seen at [facility]. She did not qualify with her BMI to see Dr. Thomason. .        Past Medical History:   Diagnosis Date    Abnormal Pap smear of cervix     Migraines      Past Surgical History:   Procedure Laterality Date    CHOLECYSTECTOMY      diagnostic laparoscopy      laproscopy N/A         STRABISMUS SURGERY Bilateral      Social History     Socioeconomic History    Marital status:      Spouse name: Not on file    Number of children: Not on file    Years of education: Not on file    Highest education level: Not on file   Social Needs    Financial resource strain: Not on file    Food insecurity - worry: Not on file    Food insecurity - inability: Not on file    Transportation needs - medical: Not on file    Transportation needs - non-medical: Not on file   Occupational History    Not on file   Tobacco Use    Smoking status: Never Smoker    Smokeless tobacco: Never Used   Substance and Sexual Activity    Alcohol use: No     Frequency: Never    Drug use: No    Sexual activity: Yes     Partners: Male   Other Topics Concern "    Not on file   Social History Narrative    Not on file     Family History   Problem Relation Age of Onset    Thyroid cancer Mother     Heart disease Mother     Ovarian cancer Neg Hx     Colon cancer Neg Hx     Breast cancer Neg Hx      OB History    Para Term  AB Living   1 1       1   SAB TAB Ectopic Multiple Live Births                  # Outcome Date GA Lbr Koby/2nd Weight Sex Delivery Anes PTL Lv   1 Para                       Current Outpatient Medications   Medication Sig Dispense Refill    dicyclomine (BENTYL) 20 mg tablet Take 1 tablet (20 mg total) by mouth 2 (two) times daily as needed. 30 tablet 11    doxepin (SINEQUAN) 10 MG capsule Take 2 capsules (20 mg total) by mouth nightly as needed (insomnia). 60 capsule 2    escitalopram oxalate (LEXAPRO) 20 MG tablet Take 1 tablet (20 mg total) by mouth once daily. 30 tablet 2    flu vac [id number] 36mos up,PF, 60 mcg (15 mcg x 4)/0.5 mL Syrg TO BE ADMIN BY [facility] 0.5 mL 0    hydrOXYzine pamoate (VISTARIL) 25 MG Cap Take 1-2 capsules daily as needed for panic 30 capsule 2    norethindrone (AYGESTIN) 5 mg Tab Take 1 tablet (5 mg total) by mouth once daily. 90 tablet 3    ondansetron (ZOFRAN-ODT) 4 MG TbDL Take 1 to 2 tablets by mouth every 12 hours as needed for nausea. 120 tablet 2    pantoprazole (PROTONIX) 40 MG tablet Take 1 tablet (40 mg total) by mouth 2 (two) times daily. 60 tablet 11    phentermine (ADIPEX-P) 37.5 mg tablet Take 0.5 tablets (18.75 mg total) by mouth 2 (two) times daily. 30 tablet 0    rizatriptan (MAXALT) 5 MG tablet Take 5 mg by mouth as needed for Migraine.       No current facility-administered medications for this visit.      Allergies: Ofloxacin     The ASCVD Risk score (Thayer KOBY Jr., et al., 2013) failed to calculate for the following reasons:    The 2013 ASCVD risk score is only valid for ages 40 to 79      ROS:  Constitutional: no weight loss, weight gain, fever, fatigue  Eyes:  No vision changes,  glasses/contacts  ENT/Mouth: No ulcers, sinus problems, ears ringing, headache  Cardiovascular: No inability to lie flat, chest pain, exercise intolerance, swelling, heart palpitations  Respiratory: No wheezing, coughing blood, shortness of breath, or cough  Gastrointestinal: No diarrhea, bloody stool, nausea/vomiting, constipation, gas, hemorrhoids  Genitourinary: No blood in urine, painful urination, urgency of urination, frequency of urination, incomplete emptying, incontinence, abnormal bleeding, painful periods, heavy periods, vaginal discharge, vaginal odor, painful intercourse, sexual problems, bleeding after intercourse.  Musculoskeletal: No muscle weakness  Skin/Breast: No painful breasts, nipple discharge, masses, rash, ulcers  Neurological: No passing out, seizures, numbness, headache  Endocrine: No diabetes, hypothyroid, hyperthyroid, hot flashes, hair loss, abnormal hair growth, acne  Psychiatric: No depression, crying  Hematologic: No bruises, bleeding, swollen lymph nodes, anemia.      Physical Exam:   Constitutional: She is oriented to person, place, and time. She appears well-developed and well-nourished.      Neck: Normal range of motion. No tracheal deviation present. No thyromegaly present.    Cardiovascular: Exam reveals no edema.     Pulmonary/Chest: Effort normal. She exhibits no mass, no tenderness, no deformity and no retraction. Right breast exhibits no inverted nipple, no mass, no nipple discharge, no skin change, no tenderness, presence, no bleeding and no swelling. Left breast exhibits no inverted nipple, no mass, no nipple discharge, no skin change, no tenderness, presence, no bleeding and no swelling. Breasts are symmetrical.        Abdominal: Soft. She exhibits no distension and no mass. There is no tenderness. There is no rebound and no guarding. No hernia. Hernia confirmed negative in the left inguinal area.     Genitourinary: Vagina normal and uterus normal. Rectal exam shows no  external hemorrhoid. There is no rash, tenderness or lesion on the right labia. There is no rash, tenderness or lesion on the left labia. Uterus is not deviated. Cervix is normal. No no adexnal prolapse. Right adnexum displays no mass, no tenderness and no fullness. Left adnexum displays no mass, no tenderness and no fullness. No tenderness, bleeding, rectocele, cystocele or unspecified prolapse of vaginal walls in the vagina. No vaginal discharge found. Cervix exhibits no motion tenderness, no discharge and no friability.           Musculoskeletal: Normal range of motion and moves all extremeties. She exhibits no edema.      Lymphadenopathy:        Right: No inguinal adenopathy present.        Left: No inguinal adenopathy present.    Neurological: She is alert and oriented to person, place, and time.    Skin: No rash noted. No erythema. No pallor.    Psychiatric: She has a normal mood and affect. Her behavior is normal. Judgment and thought content normal.         ASSESSMENT:   well woman  Sexual dysfunction  Low libido  PLAN:   pap smear  Labs today  Follow up in January to discuss sexual dysfunction  return annually or prn

## 2018-12-24 LAB — SHBG SERPL-SCNC: 25 NMOL/L

## 2018-12-26 LAB — TESTOST FREE SERPL-MCNC: 1 PG/ML

## 2019-01-02 ENCOUNTER — TELEPHONE (OUTPATIENT)
Dept: OPHTHALMOLOGY | Facility: CLINIC | Age: 29
End: 2019-01-02

## 2019-01-02 NOTE — TELEPHONE ENCOUNTER
01/02/19  Deborah returned pt call to get the nature of her office visit to make sure her appt is schedule correctly. st 11:42a      ----- Message from Micaela Cohen sent at 1/2/2019 11:26 AM CST -----  Contact: Ailyn Sapp   Pt would like to speak with  nurse to scheduled appointment ,pt can be reached at [phone] please thank you.

## 2019-01-02 NOTE — TELEPHONE ENCOUNTER
01/02/19  Deborah returned pt call and she needed to be scheduled for recurrent K-ulcer. I call Bella to get pt scheduled for Triage and she scheduled pt with Dr. Boyd. [email or web address]       ----- Message from Deepa Pollock sent at 1/2/2019 12:01 PM CST -----  Contact: Ailyn  Needs Advice    Reason for call:Pt called to discuss her current condition.        Communication Preference:[phone]    Additional Information:Asking for Deborah

## 2019-01-03 ENCOUNTER — OFFICE VISIT (OUTPATIENT)
Dept: OPHTHALMOLOGY | Facility: CLINIC | Age: 29
End: 2019-01-03
Payer: OTHER GOVERNMENT

## 2019-01-03 DIAGNOSIS — H57.89 REDNESS OF BOTH EYES: ICD-10-CM

## 2019-01-03 DIAGNOSIS — H02.889 MEIBOMIAN GLAND DISEASE, UNSPECIFIED LATERALITY: ICD-10-CM

## 2019-01-03 DIAGNOSIS — H01.009 BLEPHARITIS, UNSPECIFIED LATERALITY, UNSPECIFIED TYPE: Primary | ICD-10-CM

## 2019-01-03 DIAGNOSIS — M35.01 KCS (KERATOCONJUNCTIVITIS SICCA): ICD-10-CM

## 2019-01-03 DIAGNOSIS — H10.829 ROSACEA BLEPHAROCONJUNCTIVITIS: ICD-10-CM

## 2019-01-03 PROCEDURE — 92012 INTRM OPH EXAM EST PATIENT: CPT | Mod: S$PBB,,, | Performed by: OPHTHALMOLOGY

## 2019-01-03 PROCEDURE — 99999 PR PBB SHADOW E&M-EST. PATIENT-LVL II: ICD-10-PCS | Mod: PBBFAC,,, | Performed by: OPHTHALMOLOGY

## 2019-01-03 PROCEDURE — 99999 PR PBB SHADOW E&M-EST. PATIENT-LVL II: CPT | Mod: PBBFAC,,, | Performed by: OPHTHALMOLOGY

## 2019-01-03 PROCEDURE — 92012 PR EYE EXAM, EST PATIENT,INTERMED: ICD-10-PCS | Mod: S$PBB,,, | Performed by: OPHTHALMOLOGY

## 2019-01-03 PROCEDURE — 99212 OFFICE O/P EST SF 10 MIN: CPT | Mod: PBBFAC | Performed by: OPHTHALMOLOGY

## 2019-01-03 RX ORDER — ERYTHROMYCIN 5 MG/G
OINTMENT OPHTHALMIC NIGHTLY
Qty: 3.5 G | Refills: 12 | Status: SHIPPED | OUTPATIENT
Start: 2019-01-03 | End: 2019-01-14

## 2019-01-03 NOTE — PROGRESS NOTES
HPI     DLS: 10/27/16 With Dr. Newell    Pt states she had marginal corneal ulcers before and she wants to make   sure she doesn't have it OU.   Pt states she would have sticking pain in the OS that comes and goes.   Pt states she would wake up in the morning with her eyes bloodshot.   Pt states she doesn't wear any contacts.     Meds: Roho-col eye drop (to clear up red eyes) qam ou       POHx:   1. Exotropia   2. H/O Marginal K-Ulcer OD   3. H/O Allergic conjuctivitis OU   4. H/O Blepharitis OU     Last edited by Liana Boyd MD on 1/3/2019  2:55 PM. (History)            Assessment /Plan     For exam results, see Encounter Report.    Blepharitis, unspecified laterality, unspecified type  -     erythromycin (ROMYCIN) ophthalmic ointment; Place into both eyes every evening. Apply to clean lids and lashes at night for 10 days  Dispense: 1 Tube; Refill: 12    Meibomian gland disease, unspecified laterality  -     erythromycin (ROMYCIN) ophthalmic ointment; Place into both eyes every evening. Apply to clean lids and lashes at night for 10 days  Dispense: 1 Tube; Refill: 12    Rosacea blepharoconjunctivitis    KCS (keratoconjunctivitis sicca)    Redness of both eyes      Blepharitis // MGD / rosacea  NO marginal keratitis at this time     Rec.  WC/LH/pres free AT's // EES oint     Suggest establish care with dr Vasquez - for long term management

## 2019-01-09 ENCOUNTER — OFFICE VISIT (OUTPATIENT)
Dept: PSYCHIATRY | Facility: CLINIC | Age: 29
End: 2019-01-09
Payer: OTHER GOVERNMENT

## 2019-01-09 VITALS
WEIGHT: 204.81 LBS | SYSTOLIC BLOOD PRESSURE: 124 MMHG | BODY MASS INDEX: 28.67 KG/M2 | HEIGHT: 71 IN | DIASTOLIC BLOOD PRESSURE: 73 MMHG | HEART RATE: 71 BPM

## 2019-01-09 DIAGNOSIS — F32.5 MDD (MAJOR DEPRESSIVE DISORDER), SINGLE EPISODE, IN FULL REMISSION: ICD-10-CM

## 2019-01-09 DIAGNOSIS — F41.1 GAD (GENERALIZED ANXIETY DISORDER): Primary | ICD-10-CM

## 2019-01-09 DIAGNOSIS — F99 INSOMNIA DUE TO OTHER MENTAL DISORDER: ICD-10-CM

## 2019-01-09 DIAGNOSIS — F51.05 INSOMNIA DUE TO OTHER MENTAL DISORDER: ICD-10-CM

## 2019-01-09 PROCEDURE — 99212 OFFICE O/P EST SF 10 MIN: CPT | Mod: PBBFAC | Performed by: STUDENT IN AN ORGANIZED HEALTH CARE EDUCATION/TRAINING PROGRAM

## 2019-01-09 PROCEDURE — 99213 OFFICE O/P EST LOW 20 MIN: CPT | Mod: S$PBB,,, | Performed by: STUDENT IN AN ORGANIZED HEALTH CARE EDUCATION/TRAINING PROGRAM

## 2019-01-09 PROCEDURE — 99999 PR PBB SHADOW E&M-EST. PATIENT-LVL II: ICD-10-PCS | Mod: PBBFAC,,, | Performed by: STUDENT IN AN ORGANIZED HEALTH CARE EDUCATION/TRAINING PROGRAM

## 2019-01-09 PROCEDURE — 99213 PR OFFICE/OUTPT VISIT, EST, LEVL III, 20-29 MIN: ICD-10-PCS | Mod: S$PBB,,, | Performed by: STUDENT IN AN ORGANIZED HEALTH CARE EDUCATION/TRAINING PROGRAM

## 2019-01-09 PROCEDURE — 99999 PR PBB SHADOW E&M-EST. PATIENT-LVL II: CPT | Mod: PBBFAC,,, | Performed by: STUDENT IN AN ORGANIZED HEALTH CARE EDUCATION/TRAINING PROGRAM

## 2019-01-09 RX ORDER — ESCITALOPRAM OXALATE 20 MG/1
20 TABLET ORAL DAILY
Qty: 30 TABLET | Refills: 4 | Status: SHIPPED | OUTPATIENT
Start: 2019-01-09 | End: 2019-04-10 | Stop reason: SDUPTHER

## 2019-01-09 RX ORDER — HYDROXYZINE PAMOATE 25 MG/1
CAPSULE ORAL
Qty: 30 CAPSULE | Refills: 2 | Status: SHIPPED | OUTPATIENT
Start: 2019-01-09 | End: 2019-04-10 | Stop reason: SDUPTHER
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
## 2019-01-16 ENCOUNTER — OFFICE VISIT (OUTPATIENT)
Dept: OBSTETRICS AND GYNECOLOGY | Facility: CLINIC | Age: 29
End: 2019-01-16
Attending: OBSTETRICS & GYNECOLOGY
Payer: OTHER GOVERNMENT

## 2019-01-16 VITALS
WEIGHT: 202.19 LBS | SYSTOLIC BLOOD PRESSURE: 110 MMHG | HEIGHT: 71 IN | DIASTOLIC BLOOD PRESSURE: 72 MMHG | BODY MASS INDEX: 28.31 KG/M2

## 2019-01-16 DIAGNOSIS — R68.82 LOSS OF LIBIDO: ICD-10-CM

## 2019-01-16 DIAGNOSIS — N83.209 CYST OF OVARY, UNSPECIFIED LATERALITY: Primary | ICD-10-CM

## 2019-01-16 PROCEDURE — 99214 PR OFFICE/OUTPT VISIT, EST, LEVL IV, 30-39 MIN: ICD-10-PCS | Mod: S$GLB,,, | Performed by: OBSTETRICS & GYNECOLOGY

## 2019-01-16 PROCEDURE — 99214 OFFICE O/P EST MOD 30 MIN: CPT | Mod: S$GLB,,, | Performed by: OBSTETRICS & GYNECOLOGY

## 2019-01-17 ENCOUNTER — HOSPITAL ENCOUNTER (OUTPATIENT)
Dept: RADIOLOGY | Facility: OTHER | Age: 29
Discharge: HOME | End: 2019-01-17
Attending: OBSTETRICS & GYNECOLOGY
Payer: OTHER GOVERNMENT

## 2019-01-17 DIAGNOSIS — N83.209 CYST OF OVARY, UNSPECIFIED LATERALITY: ICD-10-CM

## 2019-01-17 PROCEDURE — 76856 US EXAM PELVIC COMPLETE: CPT | Mod: 26,,, | Performed by: RADIOLOGY

## 2019-01-17 PROCEDURE — 76830 TRANSVAGINAL US NON-OB: CPT | Mod: 26,,, | Performed by: RADIOLOGY

## 2019-01-17 PROCEDURE — 76830 US PELVIS COMP WITH TRANSVAG NON-OB (XPD): ICD-10-PCS | Mod: 26,,, | Performed by: RADIOLOGY

## 2019-01-17 PROCEDURE — 76856 US PELVIS COMP WITH TRANSVAG NON-OB (XPD): ICD-10-PCS | Mod: 26,,, | Performed by: RADIOLOGY

## 2019-01-17 PROCEDURE — 76830 TRANSVAGINAL US NON-OB: CPT | Mod: TC

## 2019-01-21 NOTE — PROGRESS NOTES
"SUBJECTIVE:   28 y.o. female  presents to discuss sexual dysfunction. Patient's last menstrual period was 2019..  She reports reading more about HSDD since her last appointment. 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 She reports that she thinks that her body image issues have also affected her libido. She has started working out and is eating better. She and her  have been communicating better. .        Past Medical History:   Diagnosis Date    Abnormal Pap smear of cervix     Migraines      Past Surgical History:   Procedure Laterality Date    CHOLECYSTECTOMY      diagnostic laparoscopy      laproscopy N/A         STRABISMUS SURGERY Bilateral      Social History     Socioeconomic History    Marital status:      Spouse name: Not on file    Number of children: Not on file    Years of education: Not on file    Highest education level: Not on file   Social Needs    Financial resource strain: Not on file    Food insecurity - worry: Not on file    Food insecurity - inability: Not on file    Transportation needs - medical: Not on file    Transportation needs - non-medical: Not on file   Occupational History    Not on file   Tobacco Use    Smoking status: Never Smoker    Smokeless tobacco: Never Used   Substance and Sexual Activity    Alcohol use: No     Frequency: Never    Drug use: No    Sexual activity: Yes     Partners: Male   Other Topics Concern    Not on file   Social History Narrative    Not on file     Family History   Problem Relation Age of Onset    Thyroid cancer Mother     Heart disease Mother     Ovarian cancer Neg Hx     Colon cancer Neg Hx     Breast cancer Neg Hx     Cancer Neg Hx      OB History    Para Term  AB Living   1 1       1   SAB TAB Ectopic Multiple Live Births                "   # Outcome Date GA Lbr Koby/2nd Weight Sex Delivery Anes PTL Lv   1 Para                       Current Outpatient Medications   Medication Sig Dispense Refill    dicyclomine (BENTYL) 20 mg tablet Take 1 tablet (20 mg total) by mouth 2 (two) times daily as needed. 30 tablet 11    escitalopram oxalate (LEXAPRO) 20 MG tablet Take 1 tablet (20 mg total) by mouth once daily. 30 tablet 4    hydrOXYzine pamoate (VISTARIL) 25 MG Cap Take 1-2 capsules daily as needed for panic 30 capsule 2    ondansetron (ZOFRAN-ODT) 4 MG TbDL Take 1 to 2 tablets by mouth every 12 hours as needed for nausea. 120 tablet 2    pantoprazole (PROTONIX) 40 MG tablet Take 1 tablet (40 mg total) by mouth 2 (two) times daily. 60 tablet 11    phentermine (ADIPEX-P) 37.5 mg tablet Take 1/2 tablet by mouth twice daily. 30 tablet 0     No current facility-administered medications for this visit.      Allergies: Ofloxacin     The ASCVD Risk score ([place] KOBY Jr., et al., 2013) failed to calculate for the following reasons:    The 2013 ASCVD risk score is only valid for ages 40 to 79      ROS:  Constitutional: no weight loss, weight gain, fever, fatigue  Eyes:  No vision changes, glasses/contacts  Genitourinary: No blood in urine, painful urination, urgency of urination, frequency of urination, incomplete emptying, incontinence, abnormal bleeding, painful periods, heavy periods, vaginal discharge, vaginal odor, painful intercourse,+ sexual problems, bleeding after intercourse.  Musculoskeletal: No muscle weakness  Skin/Breast: No painful breasts, nipple discharge, masses, rash, ulcers  Neurological: No passing out, seizures, numbness, headache  Endocrine: No diabetes, hypothyroid, hyperthyroid, hot flashes, hair loss, abnormal hair growth, acne  Psychiatric: No depression, crying  Hematologic: No bruises, bleeding, swollen lymph nodes, anemia.      Physical Exam  deferred    ASSESSMENT:   Low libido    PLAN:   Face to face time 25 minutes  Counseled  patient on low libido  Discussed Addyi as possible treatment for HSDD  Patient desires to wait- her  comes home in March for 1 month  Follow up prn

## 2019-02-19 ENCOUNTER — INITIAL CONSULT (OUTPATIENT)
Dept: OPHTHALMOLOGY | Facility: CLINIC | Age: 29
End: 2019-02-19
Payer: OTHER GOVERNMENT

## 2019-02-19 DIAGNOSIS — H01.009 BLEPHARITIS, UNSPECIFIED LATERALITY, UNSPECIFIED TYPE: Primary | ICD-10-CM

## 2019-02-19 DIAGNOSIS — H10.13 CONJUNCTIVITIS, ALLERGIC, BILATERAL: ICD-10-CM

## 2019-02-19 DIAGNOSIS — H02.889 MEIBOMIAN GLAND DISEASE, UNSPECIFIED LATERALITY: ICD-10-CM

## 2019-02-19 PROCEDURE — 99212 OFFICE O/P EST SF 10 MIN: CPT | Mod: PBBFAC | Performed by: OPHTHALMOLOGY

## 2019-02-19 PROCEDURE — 99999 PR PBB SHADOW E&M-EST. PATIENT-LVL II: CPT | Mod: PBBFAC,,, | Performed by: OPHTHALMOLOGY

## 2019-02-19 PROCEDURE — 92014 PR EYE EXAM, EST PATIENT,COMPREHESV: ICD-10-PCS | Mod: S$PBB,,, | Performed by: OPHTHALMOLOGY

## 2019-02-19 PROCEDURE — 92014 COMPRE OPH EXAM EST PT 1/>: CPT | Mod: S$PBB,,, | Performed by: OPHTHALMOLOGY

## 2019-02-19 PROCEDURE — 99999 PR PBB SHADOW E&M-EST. PATIENT-LVL II: ICD-10-PCS | Mod: PBBFAC,,, | Performed by: OPHTHALMOLOGY

## 2019-02-19 RX ORDER — DOXYCYCLINE 50 MG/1
50 CAPSULE ORAL 2 TIMES DAILY
Qty: 60 CAPSULE | Refills: 6 | Status: SHIPPED | OUTPATIENT
Start: 2019-02-19 | End: 2019-04-27

## 2019-02-19 NOTE — PROGRESS NOTES
HPI     Referred by dr allen    Exotropia   H/O Marginal K-Ulcer OD   H/O Allergic conjuctivitis OU   H/O Blepharitis OU     systane as need- not daily  Lid scrubs daily    Sore painful inner corner ou, itchy, redness, dryness, tearing  No vision changes    Last edited by Beth Vasquez MD on 2/19/2019 10:06 AM. (History)            Assessment /Plan     For exam results, see Encounter Report.    Blepharitis, unspecified laterality, unspecified type  - cont WC, LH  - doxy 50mg PO BID, side effects discussed    Conjunctivitis, allergic, bilateral  - trial of pazeo  - cool compresses, no rubbing eyes    Meibomian gland disease, unspecified laterality    RE    Establish care with optom for Mrx / REE, me prn

## 2019-02-19 NOTE — LETTER
February 19, 2019      Liana Boyd MD  [address]  [facility] [address]           [facility]  [address]  [facility] [address]  Phone: [phone]  Fax: [phone]          Patient: Ailyn Sapp   MR Number: [id number]   YOB: 1990   Date of Visit: 2/19/2019       Dear Dr. Liana Boyd:    Thank you for referring Ailyn Sapp to me for evaluation. Attached you will find relevant portions of my assessment and plan of care.    If you have questions, please do not hesitate to call me. I look forward to following Ailyn Sapp along with you.    Sincerely,    Beth Vasquez MD    Enclosure  CC:  No Recipients    If you would like to receive this communication electronically, please contact [email or web address] or [phone] to request more information on [email or web address] Link access.    For providers and/or their staff who would like to refer a patient to [facility], please contact us through our one-stop-shop provider referral line, [facility], at [phone].    If you feel you have received this communication in error or would no longer like to receive these types of communications, please e-mail [email or web address]

## 2019-02-20 ENCOUNTER — INITIAL CONSULT (OUTPATIENT)
Dept: OPTOMETRY | Facility: CLINIC | Age: 29
End: 2019-02-20
Payer: OTHER GOVERNMENT

## 2019-02-20 DIAGNOSIS — H52.203 MYOPIA OF BOTH EYES WITH ASTIGMATISM: ICD-10-CM

## 2019-02-20 DIAGNOSIS — H52.13 MYOPIA OF BOTH EYES WITH ASTIGMATISM: ICD-10-CM

## 2019-02-20 DIAGNOSIS — H01.02A SQUAMOUS BLEPHARITIS OF UPPER AND LOWER EYELIDS OF BOTH EYES: ICD-10-CM

## 2019-02-20 DIAGNOSIS — H10.13 ALLERGIC CONJUNCTIVITIS OF BOTH EYES: Primary | ICD-10-CM

## 2019-02-20 DIAGNOSIS — H01.02B SQUAMOUS BLEPHARITIS OF UPPER AND LOWER EYELIDS OF BOTH EYES: ICD-10-CM

## 2019-02-20 PROCEDURE — 92015 DETERMINE REFRACTIVE STATE: CPT | Mod: ,,, | Performed by: OPTOMETRIST

## 2019-02-20 PROCEDURE — 92014 COMPRE OPH EXAM EST PT 1/>: CPT | Mod: S$PBB,,, | Performed by: OPTOMETRIST

## 2019-02-20 PROCEDURE — 99999 PR PBB SHADOW E&M-EST. PATIENT-LVL III: CPT | Mod: PBBFAC,,, | Performed by: OPTOMETRIST

## 2019-02-20 PROCEDURE — 92014 PR EYE EXAM, EST PATIENT,COMPREHESV: ICD-10-PCS | Mod: S$PBB,,, | Performed by: OPTOMETRIST

## 2019-02-20 PROCEDURE — 99213 OFFICE O/P EST LOW 20 MIN: CPT | Mod: PBBFAC | Performed by: OPTOMETRIST

## 2019-02-20 PROCEDURE — 92015 PR REFRACTION: ICD-10-PCS | Mod: ,,, | Performed by: OPTOMETRIST

## 2019-02-20 PROCEDURE — 99999 PR PBB SHADOW E&M-EST. PATIENT-LVL III: ICD-10-PCS | Mod: PBBFAC,,, | Performed by: OPTOMETRIST

## 2019-02-20 RX ORDER — FLUOROMETHOLONE 1 MG/ML
1 SUSPENSION/ DROPS OPHTHALMIC 3 TIMES DAILY
Qty: 5 ML | Refills: 0 | Status: SHIPPED | OUTPATIENT
Start: 2019-02-20 | End: 2019-03-10

## 2019-02-20 NOTE — LETTER
February 20, 2019      Beth Vasquez MD  [address]  [facility] [address]           [facility]  [address]  [facility] [address]  Phone: [phone]          Patient: Ailyn Sapp   MR Number: [id number]   YOB: 1990   Date of Visit: 2/20/2019       Dear Dr. Beth Vasquez:    Thank you for referring Ailyn Sapp to me for evaluation. Attached you will find relevant portions of my assessment and plan of care.    If you have questions, please do not hesitate to call me. I look forward to following Ailyn Sapp along with you.    Sincerely,    Smitha Saenz, OD    Enclosure  CC:  No Recipients    If you would like to receive this communication electronically, please contact [email or web address] or [phone] to request more information on [employer] Link access.    For providers and/or their staff who would like to refer a patient to [facility], please contact us through our one-stop-shop provider referral line, [facility], at [phone].    If you feel you have received this communication in error or would no longer like to receive these types of communications, please e-mail [email or web address]

## 2019-02-20 NOTE — PATIENT INSTRUCTIONS
You have ocular allergies that are causing your eyes to feel itchy. To help with your symptoms please use over-the-counter Zaditor or Alaway; 1 drop in each eye 2 times per day. You can also use cool compresses as needed.

## 2019-02-20 NOTE — PROGRESS NOTES
HPI     Ms. Ailyn Sapp is here to establish primary eye care per Dr. Vasquez.    She reports itchiness in inner corners of eyes and along upper and lower   lid margins for about 2 months. Symptoms were improving, but worsened   again about 4 days ago. Eyes also feel sore. No discharge.    She also notices that left eye drifts sometimes. No diplopia. She reports   history of poor vision OD since high school.     Clear vision with glasses. She requests refraction today.    (+)Ocular meds:    Pazeo qDay OU (started yesterday)   Systane OU (prn, QHS-TID)   Ocusoft (used every 2-3 nights for about 1 month)   Doxycyline PO (will start later today)  (--)flashes  (+)floaters  (--)diplopia    (--)Diabetes  No results found for: HGBA1C    OCULAR HISTORY  Last Eye Exam: 2/19/2019 with Dr. Vasquez at [facility]  (+)eye surgery: strabismus sx OU (~1995)  Allergic conjunctivitis  MGD OU  Chronic blepharitis OU  Marginal ulcer of cornea OD (2016)  Exotropia    FAMILY HISTORY  (+)Glaucoma: maternal grandmother        Last edited by Smitha Saenz, OD on 2/20/2019 10:18 AM. (History)            Assessment /Plan     For exam results, see Encounter Report.    Allergic conjunctivitis of both eyes   Continue Pazeo as prescribed. Use chilled artificial tears prn, cool compresses prn, and avoid rubbing eyes.   Start FML drops TID OU  x 7 days. Shake bottle well before each use. Discussed potential adverse effects of topical steroids including elevated IOP, stressed importance of using drops no longer than 1 week. RTC if symptoms do not improve.     -     fluorometholone 0.1% (FML) 0.1 % DrpS; Place 1 drop into both eyes 3 (three) times daily. for 7 days  Dispense: 5 mL; Refill: 0    Squamous blepharitis of upper and lower eyelids of both eyes   Continue lid scrubs.     Myopia of both eyes with astigmatism   Relatively stable OU. New glasses prescription released, adaptation expected.  New glasses optional.   Eyeglass  Final Rx     Eyeglass Final Rx       Sphere Cylinder Axis    Right -3.00 +2.50 099    Left -2.50 +2.75 072    Type:  SVL    Expiration Date:  2/21/2020                   RTC 1 year

## 2019-04-10 ENCOUNTER — OFFICE VISIT (OUTPATIENT)
Dept: PSYCHIATRY | Facility: CLINIC | Age: 29
End: 2019-04-10
Payer: OTHER GOVERNMENT

## 2019-04-10 VITALS
HEART RATE: 99 BPM | HEIGHT: 71 IN | DIASTOLIC BLOOD PRESSURE: 69 MMHG | SYSTOLIC BLOOD PRESSURE: 124 MMHG | BODY MASS INDEX: 26.68 KG/M2 | WEIGHT: 190.56 LBS

## 2019-04-10 DIAGNOSIS — F40.10 SOCIAL ANXIETY DISORDER: ICD-10-CM

## 2019-04-10 DIAGNOSIS — F51.05 INSOMNIA DUE TO OTHER MENTAL DISORDER: ICD-10-CM

## 2019-04-10 DIAGNOSIS — F41.1 GAD (GENERALIZED ANXIETY DISORDER): ICD-10-CM

## 2019-04-10 DIAGNOSIS — F32.4 MDD (MAJOR DEPRESSIVE DISORDER), SINGLE EPISODE, IN PARTIAL REMISSION: Primary | ICD-10-CM

## 2019-04-10 DIAGNOSIS — F99 INSOMNIA DUE TO OTHER MENTAL DISORDER: ICD-10-CM

## 2019-04-10 PROCEDURE — 99212 OFFICE O/P EST SF 10 MIN: CPT | Mod: PBBFAC | Performed by: STUDENT IN AN ORGANIZED HEALTH CARE EDUCATION/TRAINING PROGRAM

## 2019-04-10 PROCEDURE — 99999 PR PBB SHADOW E&M-EST. PATIENT-LVL II: ICD-10-PCS | Mod: PBBFAC,,, | Performed by: STUDENT IN AN ORGANIZED HEALTH CARE EDUCATION/TRAINING PROGRAM

## 2019-04-10 PROCEDURE — 99999 PR PBB SHADOW E&M-EST. PATIENT-LVL II: CPT | Mod: PBBFAC,,, | Performed by: STUDENT IN AN ORGANIZED HEALTH CARE EDUCATION/TRAINING PROGRAM

## 2019-04-10 PROCEDURE — 99213 PR OFFICE/OUTPT VISIT, EST, LEVL III, 20-29 MIN: ICD-10-PCS | Mod: S$PBB,,, | Performed by: STUDENT IN AN ORGANIZED HEALTH CARE EDUCATION/TRAINING PROGRAM

## 2019-04-10 PROCEDURE — 99213 OFFICE O/P EST LOW 20 MIN: CPT | Mod: S$PBB,,, | Performed by: STUDENT IN AN ORGANIZED HEALTH CARE EDUCATION/TRAINING PROGRAM

## 2019-04-10 RX ORDER — BUPROPION HYDROCHLORIDE 150 MG/1
150 TABLET ORAL DAILY
Qty: 30 TABLET | Refills: 2 | Status: SHIPPED | OUTPATIENT
Start: 2019-04-10 | End: 2019-05-21 | Stop reason: SDUPTHER

## 2019-04-10 RX ORDER — HYDROXYZINE PAMOATE 25 MG/1
CAPSULE ORAL
Qty: 30 CAPSULE | Refills: 2 | Status: SHIPPED | OUTPATIENT
Start: 2019-04-10 | End: 2019-05-21 | Stop reason: SDUPTHER

## 2019-04-10 RX ORDER — ESCITALOPRAM OXALATE 20 MG/1
20 TABLET ORAL DAILY
Qty: 30 TABLET | Refills: 4 | Status: SHIPPED | OUTPATIENT
Start: 2019-04-10 | End: 2019-05-21 | Stop reason: SDUPTHER

## 2019-04-10 NOTE — PROGRESS NOTES
Outpatient Psychiatry Initial Visit (MD/NP)    4/10/2019    Ailyn Sapp, a 28 y.o. female, presenting for follow up visit. Last seen 1/9/19 Met with patient.    Reason for Encounter: Referral from Neurology. Patient complains of Anxiety.    Current medications  -Lexapro 20mg daily    -Vistaril 25mg  PRN panic- has taken just twice since last visit  -magnesium supplement  -Krill oil  -B12  -Vit D  -CoQ10        History of Present Illness:   TODAY pt reports she is doing well. Her main complaint today is that she is having some odd sweating episodes, almost like hot flashes. She stays warmer than she used to during the day and has moments where she gets flushed and sweats more than she used to. OB/GYN saw her and did labs and pelvic US-- no abnormalities found. Unsure if may be related to her SSRI.     Stopped Doxepin after last visit. Even without it, sleep overall has gotten a bit better than it used to be, 5-6h of sleep half of the time, but more like 6-8h the other nights. Not taking anything for sleep now and is happy about that. Feels sleep is not perfect but she is satisfied with it in general. Energy is ok. Appetite ok.  Since last visit has been exercising more, 1h 4-5 times a week, and cutting out sugar. Stopped phentermine 1-2 months ago. She has lost weight and this, coupled with more exercise, are very gratifying and helpful for her. Feels these things are helping her mood and helping her sleep.    Has had 2 panic attacks since last visit and used Vistaril to help with them. Had improvement within 10 minutes. Triggered usually by driving bit Vistaril helps. Head stays clear, no grogginess with the medicine. Anxiety overall under good control-- she is satisfied with this.      Life stressors include her  being transferred to [employer] in [place], so they will move this summer. She will have to take her registry test for ultrasound in May. Hopes she will be a bit more calm once these things are  "done. Also notes that working here at [employer] has been great for her--loves having a full time job and hopes to get one when she moves to [place]. Had a great vacation with her  recently for 1-2 weeks and she was deeply satisfied. Wants to be able to do that every year. 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 She was less satisfied and had less desire to get out of bed. Lasted about a week. Admits that she has had a lot of breakthrough depressive periods like this. They last 1-2 weeks at a time, and happen 3-4 times in six months. Says they are "the only think I don't like about myself." During these episode she has no SI, but no motivation and +apathy. +social isolation. Some passive desire to "leave the situation," leaving and hiding from problems (again, no desire to die). Would never act on these but the thought is appealing. Gets down to 3/10 mood (with 10 the best) and spends at least a quarter if overall time like this out of a given 6 months.  Now doing better since she is back to work. After discussion, she realizes that the bad periods (7-10 days) are pretty consistently related to the second half of her menstrual cycle. Gets better within 2-3 days of initiation of menstruation.     Discussed that she seems to be having some breakthrough episodes of depression despite Lexapro, possibly related to her cycle,  and it is unclear if her flushing/hot flashes are related to that medcine or not. Offered to swap out for new SSRI, but pt did not desire to make a change right now. Feels Lexapro has been deeply helpful for anxiety and also suspects that social stressors are largely contributing to problems and may resolve once they move this summer (eg more adult friends, new job). However, would like to consider augmentation with a second medicine if possible to help with mood.    Review Of Systems:   General:  Denies weight gain, fever, chills, " "weakness, fatigue  HEENT:  Denies vision changes, sore throat, congestion  CVS:  Denies chest pain, pressure, palpitations a this time  Resp:  Denies shortness of breath, currently  GI:  Denies diarrhea, constipation, abdominal pain, nausea, vomiting  :  Denies dysuria, hematuria, nocturia  MSK:  Denies myalgias, arthralgias  Skin:  Denies rashes, bleeding  Neuro:  Denies headache, dizziness, syncope, numbness, paresthesias  All other systems otherwise negative    Psychiatric ROS-- is patient having problems with:  Sleep: ~6h-7h/sleep per night avg  Appetite: stable  weight: stable, working to lose  Energy/anergy improved  interest/pleasure/anhedonia: improved  somatic symptoms: no  libido: improved since stopped taking hormone pill  anxiety/panic: yes, but improved, 1-2 panic only  guilty/hopelessness: no  concentration: better  S.I.B.s/risky behavior: no  Irritability: better  Racing thoughts:better  Impulsive behaviors: no  Paranoia:no  AVH:no      Current Evaluation:     Nutritional Screening: Considering the patient's height and weight, medications, medical history and preferences, should a referral be made to the dietitian? no    Constitutional  Vitals:  Most recent vital signs, dated less than 90 days prior to this appointment, were reviewed.    Vitals:    04/10/19 0849   BP: 124/69   Pulse: 99   Weight: 86.4 kg (190 lb 9.4 oz)   Height: 5' 11" (1.803 m)      General:  unremarkable, age appropriate     Musculoskeletal  Muscle Strength/Tone:  no spasicity, no tremor   Gait & Station:  non-ataxic     Psychiatric  Speech:  no latency; no press   Mood & Affect:  "pretty good"  congruent and appropriate, pleasant, euthymic appearing   Thought Process:  normal and logical   Associations:  intact   Thought Content:  normal, no suicidality, no homicidality, delusions, or paranoia   Insight:  intact, has awareness of illness   Judgement: behavior is adequate to circumstances   Orientation:  grossly intact   Memory: " intact for content of interview   Language: grossly intact   Attention Span & Concentration:  able to focus   Fund of Knowledge:  intact and appropriate to age and level of education       Relevant Elements of Neurological Exam: normal gait    Functioning in Relationships:  Spouse/partner: generally good. He is abroad with the army, coming home soon. They have had some marital problems in the past (he texted/sent photos to another woman) but they have been through counseling for this. He is supportive but not physically present right now  Family: finds her own mother has difficulty accepting son's autism diagnosis, but getting better.  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   Latest known visit with results is:   Lab Visit on 12/21/2018   Component Date Value Ref Range Status    Testosterone, Free 12/21/2018 1.0  pg/mL Final    Sex Hormone Binding Globulin 12/21/2018 25  nmol/L Final    Testosterone, Total 12/21/2018 31  5 - 73 ng/dL Final    DHEA-SO4 12/21/2018 416.0  95.8 - 511.7 ug/dL Final         Medications  Outpatient Encounter Medications as of 4/10/2019   Medication Sig Dispense Refill    dicyclomine (BENTYL) 20 mg tablet Take 1 tablet (20 mg total) by mouth 2 (two) times daily as needed. 30 tablet 11    doxycycline (MONODOX) 50 MG Cap Take 1 capsule (50 mg total) by mouth 2 (two) times daily. 60 capsule 6    escitalopram oxalate (LEXAPRO) 20 MG tablet Take 1 tablet (20 mg total) by mouth once daily. 30 tablet 4    hydrOXYzine pamoate (VISTARIL) 25 MG Cap Take 1-2 capsules daily as needed for panic 30 capsule 2    ondansetron (ZOFRAN-ODT) 4 MG TbDL Take 1 to 2 tablets by mouth every 12 hours as needed for nausea. 120 tablet 2    pantoprazole (PROTONIX) 40 MG tablet Take 1 tablet (40 mg total) by mouth 2 (two) times daily. 60 tablet 11    phentermine  (ADIPEX-P) 37.5 mg tablet Take ½ tablet (18.75 mg total) by mouth 2 (two) times daily as needed. 30 tablet 0    [DISCONTINUED] DULoxetine (CYMBALTA) 60 MG capsule Take 1 capsule (60 mg total) by mouth once daily. 30 capsule 11    [DISCONTINUED] gabapentin (NEURONTIN) 300 MG capsule Take 1 capsule by mouth at bedtime for 1 week, then 2 capsules by mouth at bedtime. 60 capsule 4    [DISCONTINUED] guanFACINE (TENEX) 1 MG Tab Take 1 tablet (1 mg total) by mouth once daily. 30 tablet 3     No facility-administered encounter medications on file as of 4/10/2019.            Assessment - Diagnosis - Goals:     Impression: Ms Ailyn Sapp is a 28 y.o. female with a history of depression, anxiety, and insomnia. anxiety is well controlled but still having breakthrough episodes of depression, most frequently 7-14 days each during second half of menstual cycle, with irritability and numerous depression (also mastalgia and abd cramps).  Insomnia is improved, even without sleep meds. Weight improved.        ICD-10-CM ICD-9-CM   1. MDD (major depressive disorder), single episode, in partial remission F32.4 296.25   2. WINDY (generalized anxiety disorder) F41.1 300.02   3. Social anxiety disorder F40.10 300.23   4. Insomnia due to other mental disorder F51.05 300.9    F99 327.02      R/o PMDD    Strengths and Liabilities: Strength: Patient accepts guidance/feedback, Strength: Patient is expressive/articulate., Strength: Patient is intelligent., Strength: Patient is motivated for change., Strength: Patient is physically healthy., Strength: Patient has positive support network., Strength: Patient has reasonable judgment.    Treatment Plan/Recommendations:   · Medication Management: The risks and benefits of medication were discussed with the patient.  -Continue Lexapro 20mg. Doing well on this, chas with regard to anxiety  -Continue Vistaril 25-50mg daily PRN for panic attacks. Has just needed once  -Start Wellbutrin XL 150mg  daily for antidepressant augmentation. Discussed risks and side effects. No hx of eating disorders or seizures. Pt agrees   -get CMP today and likely at visit to ensure no issues with LFTs (has a history of tiny unexplained LFT bump. If any higher than before today, will tell pt to hold off the Wellbutrin)      -can use OTC melatonin supplement PRN insomnia      Discussed risks and benefits of all medications, including but not limited to sedation, serotonin syndrome, hypotension, HA, GI sx, arrhythmia, others.    Monitoring   -baseline EKG 10/2/18 with sinus val, otherwise normal. QTc 424.   -LFTs very slightly elevated in Sept 2018, but totally unchanged from 2 years ago. PCP follows her for this. Had recent ultrasound, WNL  -stopped BC pill but aware she must use contraception due to medications  -CMP to test LFTs today, CBC today due to reported single episode of orthostasis yesterday     Return to Clinic: 4-6 weeks , or sooner if needed       Total time: 30 min

## 2019-04-11 ENCOUNTER — PATIENT MESSAGE (OUTPATIENT)
Dept: PSYCHIATRY | Facility: CLINIC | Age: 29
End: 2019-04-11

## 2019-04-11 ENCOUNTER — LAB VISIT (OUTPATIENT)
Dept: LAB | Facility: HOSPITAL | Age: 29
End: 2019-04-11
Payer: OTHER GOVERNMENT

## 2019-04-11 DIAGNOSIS — F32.4 MDD (MAJOR DEPRESSIVE DISORDER), SINGLE EPISODE, IN PARTIAL REMISSION: ICD-10-CM

## 2019-04-11 LAB
ALBUMIN SERPL BCP-MCNC: 4.2 G/DL (ref 3.5–5.2)
ALP SERPL-CCNC: 126 U/L (ref 55–135)
ALT SERPL W/O P-5'-P-CCNC: 134 U/L (ref 10–44)
ANION GAP SERPL CALC-SCNC: 8 MMOL/L (ref 8–16)
AST SERPL-CCNC: 38 U/L (ref 10–40)
BASOPHILS # BLD AUTO: 0.07 K/UL (ref 0–0.2)
BASOPHILS NFR BLD: 0.9 % (ref 0–1.9)
BILIRUB SERPL-MCNC: 0.7 MG/DL (ref 0.1–1)
BUN SERPL-MCNC: 13 MG/DL (ref 6–20)
CALCIUM SERPL-MCNC: 9.7 MG/DL (ref 8.7–10.5)
CHLORIDE SERPL-SCNC: 104 MMOL/L (ref 95–110)
CO2 SERPL-SCNC: 24 MMOL/L (ref 23–29)
CREAT SERPL-MCNC: 0.8 MG/DL (ref 0.5–1.4)
DIFFERENTIAL METHOD: NORMAL
EOSINOPHIL # BLD AUTO: 0.1 K/UL (ref 0–0.5)
EOSINOPHIL NFR BLD: 1.5 % (ref 0–8)
ERYTHROCYTE [DISTWIDTH] IN BLOOD BY AUTOMATED COUNT: 12.9 % (ref 11.5–14.5)
EST. GFR  (AFRICAN AMERICAN): >60 ML/MIN/1.73 M^2
EST. GFR  (NON AFRICAN AMERICAN): >60 ML/MIN/1.73 M^2
GLUCOSE SERPL-MCNC: 92 MG/DL (ref 70–110)
HCT VFR BLD AUTO: 42 % (ref 37–48.5)
HGB BLD-MCNC: 14.3 G/DL (ref 12–16)
IMM GRANULOCYTES # BLD AUTO: 0.04 K/UL (ref 0–0.04)
IMM GRANULOCYTES NFR BLD AUTO: 0.5 % (ref 0–0.5)
LYMPHOCYTES # BLD AUTO: 2.1 K/UL (ref 1–4.8)
LYMPHOCYTES NFR BLD: 25.4 % (ref 18–48)
MCH RBC QN AUTO: 30.7 PG (ref 27–31)
MCHC RBC AUTO-ENTMCNC: 34 G/DL (ref 32–36)
MCV RBC AUTO: 90 FL (ref 82–98)
MONOCYTES # BLD AUTO: 0.6 K/UL (ref 0.3–1)
MONOCYTES NFR BLD: 6.8 % (ref 4–15)
NEUTROPHILS # BLD AUTO: 5.3 K/UL (ref 1.8–7.7)
NEUTROPHILS NFR BLD: 64.9 % (ref 38–73)
NRBC BLD-RTO: 0 /100 WBC
PLATELET # BLD AUTO: 309 K/UL (ref 150–350)
PMV BLD AUTO: 10.3 FL (ref 9.2–12.9)
POTASSIUM SERPL-SCNC: 4.2 MMOL/L (ref 3.5–5.1)
PROT SERPL-MCNC: 7.7 G/DL (ref 6–8.4)
RBC # BLD AUTO: 4.66 M/UL (ref 4–5.4)
SODIUM SERPL-SCNC: 136 MMOL/L (ref 136–145)
WBC # BLD AUTO: 8.22 K/UL (ref 3.9–12.7)

## 2019-04-11 PROCEDURE — 36415 COLL VENOUS BLD VENIPUNCTURE: CPT

## 2019-04-11 PROCEDURE — 85025 COMPLETE CBC W/AUTO DIFF WBC: CPT

## 2019-04-11 PROCEDURE — 80053 COMPREHEN METABOLIC PANEL: CPT

## 2019-04-12 ENCOUNTER — PATIENT MESSAGE (OUTPATIENT)
Dept: PSYCHIATRY | Facility: CLINIC | Age: 29
End: 2019-04-12

## 2019-04-14 ENCOUNTER — PATIENT MESSAGE (OUTPATIENT)
Dept: OPTOMETRY | Facility: CLINIC | Age: 29
End: 2019-04-14

## 2019-04-15 ENCOUNTER — TELEPHONE (OUTPATIENT)
Dept: DERMATOLOGY | Facility: CLINIC | Age: 29
End: 2019-04-15

## 2019-04-15 NOTE — TELEPHONE ENCOUNTER
----- Message from Kaitlyn Argueta MA sent at 4/12/2019  4:12 PM CDT -----  Contact: pt       ----- Message -----  From: Shannan Smith  Sent: 4/12/2019   2:25 PM  To: Rosmery MASSEY Staff    BAILEY -Needs Advice    Reason for call: pt is calling to cancel and reschedule her appt for a later time pt is a new pt to derm and has an appt on 4/15/2019 pt said it can be any day or time         Communication Preference: can you please call pt at [phone]    Additional Information: none

## 2019-04-16 ENCOUNTER — PATIENT MESSAGE (OUTPATIENT)
Dept: PSYCHIATRY | Facility: CLINIC | Age: 29
End: 2019-04-16

## 2019-04-26 ENCOUNTER — PATIENT MESSAGE (OUTPATIENT)
Dept: OBSTETRICS AND GYNECOLOGY | Facility: CLINIC | Age: 29
End: 2019-04-26

## 2019-04-27 ENCOUNTER — OFFICE VISIT (OUTPATIENT)
Dept: INTERNAL MEDICINE | Facility: CLINIC | Age: 29
End: 2019-04-27
Payer: OTHER GOVERNMENT

## 2019-04-27 VITALS
TEMPERATURE: 98 F | DIASTOLIC BLOOD PRESSURE: 72 MMHG | OXYGEN SATURATION: 97 % | SYSTOLIC BLOOD PRESSURE: 120 MMHG | HEART RATE: 87 BPM | WEIGHT: 189.13 LBS | HEIGHT: 70 IN | BODY MASS INDEX: 27.08 KG/M2

## 2019-04-27 DIAGNOSIS — B02.9 HERPES ZOSTER WITHOUT COMPLICATION: Primary | ICD-10-CM

## 2019-04-27 PROCEDURE — 99213 PR OFFICE/OUTPT VISIT, EST, LEVL III, 20-29 MIN: ICD-10-PCS | Mod: S$PBB,,, | Performed by: INTERNAL MEDICINE

## 2019-04-27 PROCEDURE — 99213 OFFICE O/P EST LOW 20 MIN: CPT | Mod: PBBFAC | Performed by: INTERNAL MEDICINE

## 2019-04-27 PROCEDURE — 99999 PR PBB SHADOW E&M-EST. PATIENT-LVL III: ICD-10-PCS | Mod: PBBFAC,,, | Performed by: INTERNAL MEDICINE

## 2019-04-27 PROCEDURE — 99213 OFFICE O/P EST LOW 20 MIN: CPT | Mod: S$PBB,,, | Performed by: INTERNAL MEDICINE

## 2019-04-27 PROCEDURE — 99999 PR PBB SHADOW E&M-EST. PATIENT-LVL III: CPT | Mod: PBBFAC,,, | Performed by: INTERNAL MEDICINE

## 2019-04-27 RX ORDER — VALACYCLOVIR HYDROCHLORIDE 1 G/1
1000 TABLET, FILM COATED ORAL
Qty: 21 TABLET | Refills: 0 | Status: SHIPPED | OUTPATIENT
Start: 2019-04-27 | End: 2019-05-23

## 2019-04-27 NOTE — PROGRESS NOTES
Subjective:       Patient ID: Ailyn Sapp is a 28 y.o. female.    Chief Complaint: Herpes Zoster    Painful rash on left buttock for 2 days.  Started near midline and spread laterally and downward. Is painful    Review of Systems   Constitutional: Negative for activity change, chills, fatigue and fever.   HENT: Negative for congestion, ear pain, nosebleeds, postnasal drip, sinus pressure and sore throat.    Eyes: Negative.  Negative for visual disturbance.   Respiratory: Negative for cough, chest tightness, shortness of breath and wheezing.    Cardiovascular: Negative for chest pain.   Gastrointestinal: Negative for abdominal pain, diarrhea, nausea and vomiting.   Genitourinary: Negative for difficulty urinating, dysuria, frequency and urgency.   Musculoskeletal: Negative for arthralgias and neck stiffness.   Skin: Negative for rash.   Neurological: Negative for dizziness, weakness and headaches.   Psychiatric/Behavioral: Negative for sleep disturbance. The patient is not nervous/anxious.        Objective:      Physical Exam   Skin:            Assessment:       1. Herpes zoster without complication        Plan:   Ailyn was seen today for herpes zoster.    Diagnoses and all orders for this visit:    Herpes zoster without complication    Other orders  -     valACYclovir (VALTREX) 1000 MG tablet; Take 1 tablet (1,000 mg total) by mouth 3 (three) times daily with meals. for 7 days

## 2019-04-28 ENCOUNTER — PATIENT MESSAGE (OUTPATIENT)
Dept: INTERNAL MEDICINE | Facility: CLINIC | Age: 29
End: 2019-04-28

## 2019-04-29 ENCOUNTER — OFFICE VISIT (OUTPATIENT)
Dept: INTERNAL MEDICINE | Facility: CLINIC | Age: 29
End: 2019-04-29
Payer: OTHER GOVERNMENT

## 2019-04-29 VITALS
HEIGHT: 70 IN | BODY MASS INDEX: 27.36 KG/M2 | HEART RATE: 98 BPM | SYSTOLIC BLOOD PRESSURE: 110 MMHG | DIASTOLIC BLOOD PRESSURE: 88 MMHG | OXYGEN SATURATION: 95 % | WEIGHT: 191.13 LBS

## 2019-04-29 DIAGNOSIS — B02.8 HERPES ZOSTER WITH COMPLICATION: Primary | ICD-10-CM

## 2019-04-29 PROCEDURE — 99214 OFFICE O/P EST MOD 30 MIN: CPT | Mod: PBBFAC | Performed by: NURSE PRACTITIONER

## 2019-04-29 PROCEDURE — 99999 PR PBB SHADOW E&M-EST. PATIENT-LVL IV: ICD-10-PCS | Mod: PBBFAC,,, | Performed by: NURSE PRACTITIONER

## 2019-04-29 PROCEDURE — 99213 PR OFFICE/OUTPT VISIT, EST, LEVL III, 20-29 MIN: ICD-10-PCS | Mod: S$PBB,,, | Performed by: NURSE PRACTITIONER

## 2019-04-29 PROCEDURE — 99999 PR PBB SHADOW E&M-EST. PATIENT-LVL IV: CPT | Mod: PBBFAC,,, | Performed by: NURSE PRACTITIONER

## 2019-04-29 PROCEDURE — 99213 OFFICE O/P EST LOW 20 MIN: CPT | Mod: S$PBB,,, | Performed by: NURSE PRACTITIONER

## 2019-04-29 RX ORDER — GABAPENTIN 300 MG/1
300 CAPSULE ORAL 3 TIMES DAILY
Qty: 90 CAPSULE | Refills: 11 | Status: SHIPPED | OUTPATIENT
Start: 2019-04-29 | End: 2019-05-23

## 2019-04-29 NOTE — PATIENT INSTRUCTIONS
Slowly titrate Gabapentin for pain    Continue Valtrex until completed    Call for any concerns      Shingles (Herpes Zoster)     Talk to your healthcare provider about the shingles vaccine.     Shingles is also called herpes zoster. It is a painful skin rash caused by the herpes zoster virus. This is the same virus that causes chickenpox. After a person has chickenpox, the virus remains inactive in the nerve cells. Years later, the virus can become active again and travel to the skin. Most people have shingles only once, but it is possible to have it more than once.  What are the risk factors for shingles?  Anyone who has ever had chickenpox can develop shingles. But your risk is greater if you:  · Are 50 years of age or older  · Have an illness that weakens your immune system, such as HIV/AIDS  · Have cancer, especially Hodgkin disease or lymphoma  · Take medicines that weaken your immune system  What are the symptoms of shingles?  · The first sign of shingles is usually pain, burning, tingling, or itching on one part of your face or body. You may also feel as if you have the flu, with fever and chills.  · A red rash with small blisters appears within a few days. The rash may appear as follows:   ¨ The blisters can occur anywhere, but theyre most common on the back, chest, or abdomen.  ¨ They usually appear on only one side of the body, spreading along the nerve pathway where the virus was inactive.   ¨ The rash can also form around an eye, along one side of the face or neck, or in the mouth.  ¨ In a few people, usually those with weakened immune systems, shingles appear on more than one part of the body at once.  · After a few days, the blisters become dry and form a crust. The crust falls off in days to weeks. The blisters generally do not leave scars.  How is shingles treated?  For most people, shingles heals on its own in a few weeks. But treatment is recommended to help relieve pain, speed healing, and  reduce the risk of complications. Antiviral medicines are prescribed within the first 72 hours of the appearance of the rash. To lessen symptoms:  · Apply ice packs (wrapped in a thin towel) or cool compresses, or soak in a cool bath.  · Use calamine lotion to calm itchy skin.  · Ask your healthcare provider about over-the-counter pain relievers. If your pain is severe, your healthcare provider may prescribe stronger pain medicines.  What are the complications of shingles?  Shingles often goes away with no lasting effects. But some people have serious problems long after the blisters have healed:  · Postherpetic neuralgia. This is the most common complication. It is severe nerve pain at the place where the rash used to be. It can last for months, or even years after you have had shingles. Medicines can be prescribed to help relieve the pain and improve quality of life.  · Bacterial infection. Shingles blisters may become infected with bacteria. Antibiotic medicine is used to treat the infection.  · Eye problems. A person with shingles on the face should see his or her healthcare provider right away. Shingles can cause serious problems with vision, and even blindness.  Very rarely shingles can also lead to pneumonia, hearing problems, brain inflammation, or even death.   When to seek medical care  Contact your healthcare provider if you experience any of the following:  · Symptoms that dont go away with treatment  · A rash or blisters near your eye  · Increased drainage, fever, or rash after treatment, or severe pain that doesnt go away   How can shingles be prevented?  You can only get shingles if you have had chicken pox in the past. Those who have never had chickenpox can get the virus from you. Although instead of developing shingles, the person may get chickenpox. Until your blisters form scabs, avoid contact with others, especially the following:  · Pregnant women who have never had chickenpox or the  vaccine  · Infants who were born early (prematurely) or who had low weight at birth  · People with weak immune system (for example, people receiving chemotherapy for cancer, people who have had organ transplants, or people with HIV infections)     The shingles vaccine  If youre 60 years of age or older, ask your healthcare provider if you should receive the shingles vaccine. The vaccine makes it less likely that you will develop shingles. If you do develop shingles, your symptoms will likely be milder than if you hadnt been vaccinated. Note: Although the vaccine is licensed for people 50 years of age or older, the CDC does not recommend the vaccine for those who are 50 to 59 years old.   Date Last Reviewed: 10/1/2016  © 1532-7947 [employer]. [address] [place] [address], [place] [address]. All rights reserved. This information is not intended as a substitute for professional medical care. Always follow your healthcare professional's instructions.

## 2019-04-29 NOTE — PROGRESS NOTES
Subjective:       Patient ID: Ailyn Sapp is a 28 y.o. female.    Chief Complaint: Herpes Zoster    Disclaimer: This note has been generated using voice-recognition software. There may be typographical errors that have been missed during proof-reading  Pt of Dr Boyce here for same-day appointment for shingles follow-up.  Patient was diagnosed with shingles on 4 918 by Dr. Baum.  Patient states there is very painful and she cannot tell if they are getting better or not because of the location.    Review of Systems   Constitutional: Negative for activity change, appetite change and fever.   HENT: Negative for congestion.    Respiratory: Negative for cough.    Cardiovascular: Negative for chest pain and palpitations.   Gastrointestinal: Negative for abdominal pain, constipation, diarrhea, nausea and vomiting.   Genitourinary: Negative for difficulty urinating.   Skin: Positive for rash.   Psychiatric/Behavioral: Positive for sleep disturbance.        2/2 pain           Past Medical History:   Diagnosis Date    Abnormal Pap smear of cervix     Migraines      Past Surgical History:   Procedure Laterality Date    CHOLECYSTECTOMY      diagnostic laparoscopy      laproscopy N/A     2011    STRABISMUS SURGERY Bilateral      Social History     Social History Narrative    Not on file     Family History   Problem Relation Age of Onset    Thyroid cancer Mother     Heart disease Mother     Ovarian cancer Neg Hx     Colon cancer Neg Hx     Breast cancer Neg Hx     Cancer Neg Hx      Outpatient Encounter Medications as of 4/29/2019   Medication Sig Dispense Refill    buPROPion (WELLBUTRIN XL) 150 MG TB24 tablet Take 1 tablet (150 mg total) by mouth once daily. 30 tablet 2    dicyclomine (BENTYL) 20 mg tablet Take 1 tablet (20 mg total) by mouth 2 (two) times daily as needed. 30 tablet 11    escitalopram oxalate (LEXAPRO) 20 MG tablet Take 1 tablet (20 mg total) by mouth once daily. 30 tablet 4     "hydrOXYzine pamoate (VISTARIL) 25 MG Cap Take 1-2 capsules by mouth once daily as needed for panic 30 capsule 2    ondansetron (ZOFRAN-ODT) 4 MG TbDL Take 1 to 2 tablets by mouth every 12 hours as needed for nausea. 120 tablet 2    pantoprazole (PROTONIX) 40 MG tablet Take 1 tablet (40 mg total) by mouth 2 (two) times daily. 60 tablet 11    valACYclovir (VALTREX) 1000 MG tablet Take 1 tablet (1,000 mg total) by mouth 3 (three) times daily with meals. for 7 days 21 tablet 0    gabapentin (NEURONTIN) 300 MG capsule Take 1 capsule (300 mg total) by mouth 3 (three) times daily. 90 capsule 11    [DISCONTINUED] DULoxetine (CYMBALTA) 60 MG capsule Take 1 capsule (60 mg total) by mouth once daily. 30 capsule 11    [DISCONTINUED] gabapentin (NEURONTIN) 300 MG capsule Take 1 capsule by mouth at bedtime for 1 week, then 2 capsules by mouth at bedtime. 60 capsule 4    [DISCONTINUED] guanFACINE (TENEX) 1 MG Tab Take 1 tablet (1 mg total) by mouth once daily. 30 tablet 3     No facility-administered encounter medications on file as of 4/29/2019.      Last 3 sets of Vitals  Vitals - 1 value per visit 2/20/2019 4/27/2019 4/29/2019   SYSTOLIC - 120 110   DIASTOLIC - 72 88   PULSE - 87 98   TEMPERATURE - 97.8 -   RESPIRATIONS - - -   SPO2 - 97 95   Weight (lb) - 189.15 191.14   Weight (kg) - 85.8 86.7   HEIGHT - 5' 10" 5' 10"   BODY MASS INDEX - 27.14 27.43   VISIT REPORT - - -   Pain Score  4 0 9   Some encounter information is confidential and restricted. Go to Review Flowsheets activity to see all data.         Objective:      Physical Exam   Constitutional: She is oriented to person, place, and time. She appears well-developed and well-nourished. No distress.   HENT:   Head: Normocephalic and atraumatic.   Pulmonary/Chest: Effort normal.   Neurological: She is alert and oriented to person, place, and time.   Skin: Skin is warm and dry. Capillary refill takes less than 2 seconds. Rash noted. Rash is vesicular. She is not " diaphoretic.        Cluster of healing vesicular lesions on erythematous base.  Improved from pt's pictures     Psychiatric: She has a normal mood and affect. Her behavior is normal. Judgment and thought content normal.   Nursing note and vitals reviewed.          Lab Results   Component Value Date    WBC 8.22 04/11/2019    RBC 4.66 04/11/2019    HGB 14.3 04/11/2019    HCT 42.0 04/11/2019    MCV 90 04/11/2019    MCH 30.7 04/11/2019    MCHC 34.0 04/11/2019    RDW 12.9 04/11/2019     04/11/2019    MPV 10.3 04/11/2019    GRAN 5.3 04/11/2019    GRAN 64.9 04/11/2019    LYMPH 2.1 04/11/2019    LYMPH 25.4 04/11/2019    MONO 0.6 04/11/2019    MONO 6.8 04/11/2019    EOS 0.1 04/11/2019    BASO 0.07 04/11/2019    EOSINOPHIL 1.5 04/11/2019    BASOPHIL 0.9 04/11/2019     Lab Results   Component Value Date    WBC 8.22 04/11/2019    HGB 14.3 04/11/2019    HCT 42.0 04/11/2019     04/11/2019    CHOL 175 10/27/2018    TRIG 89 10/27/2018    HDL 34 (L) 10/27/2018     (H) 04/11/2019    AST 38 04/11/2019     04/11/2019    K 4.2 04/11/2019     04/11/2019    CREATININE 0.8 04/11/2019    BUN 13 04/11/2019    CO2 24 04/11/2019    TSH 1.272 10/27/2018       Assessment:       1. Herpes zoster with complication        Plan:           Ailyn was seen today for herpes zoster.    Diagnoses and all orders for this visit:    Herpes zoster with complication  -     gabapentin (NEURONTIN) 300 MG capsule; Take 1 capsule (300 mg total) by mouth 3 (three) times daily.      Patient Instructions     Slowly titrate Gabapentin for pain    Continue Valtrex until completed    Call for any concerns      Shingles (Herpes Zoster)     Talk to your healthcare provider about the shingles vaccine.     Shingles is also called herpes zoster. It is a painful skin rash caused by the herpes zoster virus. This is the same virus that causes chickenpox. After a person has chickenpox, the virus remains inactive in the nerve cells. Years later,  the virus can become active again and travel to the skin. Most people have shingles only once, but it is possible to have it more than once.  What are the risk factors for shingles?  Anyone who has ever had chickenpox can develop shingles. But your risk is greater if you:  · Are 50 years of age or older  · Have an illness that weakens your immune system, such as HIV/AIDS  · Have cancer, especially Hodgkin disease or lymphoma  · Take medicines that weaken your immune system  What are the symptoms of shingles?  · The first sign of shingles is usually pain, burning, tingling, or itching on one part of your face or body. You may also feel as if you have the flu, with fever and chills.  · A red rash with small blisters appears within a few days. The rash may appear as follows:   ¨ The blisters can occur anywhere, but theyre most common on the back, chest, or abdomen.  ¨ They usually appear on only one side of the body, spreading along the nerve pathway where the virus was inactive.   ¨ The rash can also form around an eye, along one side of the face or neck, or in the mouth.  ¨ In a few people, usually those with weakened immune systems, shingles appear on more than one part of the body at once.  · After a few days, the blisters become dry and form a crust. The crust falls off in days to weeks. The blisters generally do not leave scars.  How is shingles treated?  For most people, shingles heals on its own in a few weeks. But treatment is recommended to help relieve pain, speed healing, and reduce the risk of complications. Antiviral medicines are prescribed within the first 72 hours of the appearance of the rash. To lessen symptoms:  · Apply ice packs (wrapped in a thin towel) or cool compresses, or soak in a cool bath.  · Use calamine lotion to calm itchy skin.  · Ask your healthcare provider about over-the-counter pain relievers. If your pain is severe, your healthcare provider may prescribe stronger pain  medicines.  What are the complications of shingles?  Shingles often goes away with no lasting effects. But some people have serious problems long after the blisters have healed:  · Postherpetic neuralgia. This is the most common complication. It is severe nerve pain at the place where the rash used to be. It can last for months, or even years after you have had shingles. Medicines can be prescribed to help relieve the pain and improve quality of life.  · Bacterial infection. Shingles blisters may become infected with bacteria. Antibiotic medicine is used to treat the infection.  · Eye problems. A person with shingles on the face should see his or her healthcare provider right away. Shingles can cause serious problems with vision, and even blindness.  Very rarely shingles can also lead to pneumonia, hearing problems, brain inflammation, or even death.   When to seek medical care  Contact your healthcare provider if you experience any of the following:  · Symptoms that dont go away with treatment  · A rash or blisters near your eye  · Increased drainage, fever, or rash after treatment, or severe pain that doesnt go away   How can shingles be prevented?  You can only get shingles if you have had chicken pox in the past. Those who have never had chickenpox can get the virus from you. Although instead of developing shingles, the person may get chickenpox. Until your blisters form scabs, avoid contact with others, especially the following:  · Pregnant women who have never had chickenpox or the vaccine  · Infants who were born early (prematurely) or who had low weight at birth  · People with weak immune system (for example, people receiving chemotherapy for cancer, people who have had organ transplants, or people with HIV infections)     The shingles vaccine  If youre 60 years of age or older, ask your healthcare provider if you should receive the shingles vaccine. The vaccine makes it less likely that you will develop  shingles. If you do develop shingles, your symptoms will likely be milder than if you hadnt been vaccinated. Note: Although the vaccine is licensed for people 50 years of age or older, the CDC does not recommend the vaccine for those who are 50 to 59 years old.   Date Last Reviewed: 10/1/2016  © 4992-6678 [employer]. [address] [place] [address], [place] [address]. All rights reserved. This information is not intended as a substitute for professional medical care. Always follow your healthcare professional's instructions.

## 2019-05-02 ENCOUNTER — OFFICE VISIT (OUTPATIENT)
Dept: URGENT CARE | Facility: CLINIC | Age: 29
End: 2019-05-02
Payer: OTHER GOVERNMENT

## 2019-05-02 VITALS
RESPIRATION RATE: 18 BRPM | WEIGHT: 191 LBS | TEMPERATURE: 97 F | HEART RATE: 82 BPM | HEIGHT: 70 IN | BODY MASS INDEX: 27.35 KG/M2 | SYSTOLIC BLOOD PRESSURE: 104 MMHG | OXYGEN SATURATION: 100 % | DIASTOLIC BLOOD PRESSURE: 71 MMHG

## 2019-05-02 DIAGNOSIS — R79.89 ABNORMAL LIVER FUNCTION TESTS: Primary | ICD-10-CM

## 2019-05-02 DIAGNOSIS — B02.9 HERPES ZOSTER WITHOUT COMPLICATION: ICD-10-CM

## 2019-05-02 PROCEDURE — 99214 OFFICE O/P EST MOD 30 MIN: CPT | Mod: S$GLB,,, | Performed by: INTERNAL MEDICINE

## 2019-05-02 PROCEDURE — 99214 PR OFFICE/OUTPT VISIT, EST, LEVL IV, 30-39 MIN: ICD-10-PCS | Mod: S$GLB,,, | Performed by: INTERNAL MEDICINE

## 2019-05-02 RX ORDER — HYDROCODONE BITARTRATE AND ACETAMINOPHEN 10; 325 MG/1; MG/1
1 TABLET ORAL EVERY 6 HOURS
Qty: 20 TABLET | Refills: 0 | Status: SHIPPED | OUTPATIENT
Start: 2019-05-02 | End: 2019-05-23

## 2019-05-02 NOTE — PROGRESS NOTES
"livSubjective:       Patient ID: Ailyn Sapp is a 28 y.o. female.    Vitals:  height is 5' 10" (1.778 m) and weight is 86.6 kg (191 lb). Her oral temperature is 97.3 °F (36.3 °C). Her blood pressure is 104/71 and her pulse is 82. Her respiration is 18 and oxygen saturation is 100%.     Chief Complaint: Elevated Hepatic Enzymes    Other   Pertinent negatives include no arthralgias, chills, congestion, coughing, fatigue, fever, joint swelling, myalgias, nausea, rash, sore throat, vertigo, vomiting or weakness.       Constitution: Negative for chills, fatigue and fever.   HENT: Negative for congestion and sore throat.    Neck: Negative for painful lymph nodes.   Cardiovascular: Negative for leg swelling.   Eyes: Negative for double vision.   Respiratory: Negative for cough.    Gastrointestinal: Negative for nausea, vomiting and diarrhea.   Genitourinary: Negative for dysuria, frequency, urgency and history of kidney stones.   Musculoskeletal: Negative for joint pain, joint swelling, muscle cramps and muscle ache.   Skin: Negative for color change, pale, rash and bruising.   Allergic/Immunologic: Negative for seasonal allergies.   Neurological: Negative for dizziness, history of vertigo, light-headedness and passing out.   Hematologic/Lymphatic: Negative for swollen lymph nodes.   Psychiatric/Behavioral: Negative for nervous/anxious, sleep disturbance and depression. The patient is not nervous/anxious.        Objective:      Physical Exam   Constitutional: She appears well-developed and well-nourished.   HENT:   Head: Normocephalic and atraumatic.   Eyes: Pupils are equal, round, and reactive to light. Conjunctivae and EOM are normal.   Neck: Normal range of motion. Neck supple.   Cardiovascular: Normal rate and regular rhythm.   Pulmonary/Chest: Effort normal and breath sounds normal.   Abdominal: Soft. Bowel sounds are normal.   Skin:   Herpetic rash in dermatomal pattern R hip   Nursing note and vitals " reviewed.      Assessment:       1. Abnormal liver function tests    2. Herpes zoster without complication        Plan:         Abnormal liver function tests  -     Ambulatory referral to Gastroenterology    Herpes zoster without complication  -     HYDROcodone-acetaminophen (NORCO)  mg per tablet; 1 po q6 hours prn pain  Dispense: 20 tablet; Refill: 0

## 2019-05-21 ENCOUNTER — OFFICE VISIT (OUTPATIENT)
Dept: PSYCHIATRY | Facility: CLINIC | Age: 29
End: 2019-05-21
Payer: OTHER GOVERNMENT

## 2019-05-21 VITALS
HEIGHT: 70 IN | BODY MASS INDEX: 26.48 KG/M2 | HEART RATE: 97 BPM | WEIGHT: 184.94 LBS | SYSTOLIC BLOOD PRESSURE: 115 MMHG | DIASTOLIC BLOOD PRESSURE: 67 MMHG

## 2019-05-21 DIAGNOSIS — F51.05 INSOMNIA DUE TO OTHER MENTAL DISORDER: ICD-10-CM

## 2019-05-21 DIAGNOSIS — F40.10 SOCIAL ANXIETY DISORDER: ICD-10-CM

## 2019-05-21 DIAGNOSIS — F41.1 GAD (GENERALIZED ANXIETY DISORDER): ICD-10-CM

## 2019-05-21 DIAGNOSIS — F32.5 MDD (MAJOR DEPRESSIVE DISORDER), SINGLE EPISODE, IN FULL REMISSION: Primary | ICD-10-CM

## 2019-05-21 DIAGNOSIS — F99 INSOMNIA DUE TO OTHER MENTAL DISORDER: ICD-10-CM

## 2019-05-21 PROCEDURE — 99213 OFFICE O/P EST LOW 20 MIN: CPT | Mod: S$PBB,,, | Performed by: STUDENT IN AN ORGANIZED HEALTH CARE EDUCATION/TRAINING PROGRAM

## 2019-05-21 PROCEDURE — 99212 OFFICE O/P EST SF 10 MIN: CPT | Mod: PBBFAC | Performed by: STUDENT IN AN ORGANIZED HEALTH CARE EDUCATION/TRAINING PROGRAM

## 2019-05-21 PROCEDURE — 99999 PR PBB SHADOW E&M-EST. PATIENT-LVL II: CPT | Mod: PBBFAC,,, | Performed by: STUDENT IN AN ORGANIZED HEALTH CARE EDUCATION/TRAINING PROGRAM

## 2019-05-21 PROCEDURE — 99999 PR PBB SHADOW E&M-EST. PATIENT-LVL II: ICD-10-PCS | Mod: PBBFAC,,, | Performed by: STUDENT IN AN ORGANIZED HEALTH CARE EDUCATION/TRAINING PROGRAM

## 2019-05-21 PROCEDURE — 99213 PR OFFICE/OUTPT VISIT, EST, LEVL III, 20-29 MIN: ICD-10-PCS | Mod: S$PBB,,, | Performed by: STUDENT IN AN ORGANIZED HEALTH CARE EDUCATION/TRAINING PROGRAM

## 2019-05-21 RX ORDER — ESCITALOPRAM OXALATE 20 MG/1
20 TABLET ORAL DAILY
Qty: 30 TABLET | Refills: 4 | Status: SHIPPED | OUTPATIENT
Start: 2019-05-21 | End: 2019-06-20

## 2019-05-21 RX ORDER — BUPROPION HYDROCHLORIDE 150 MG/1
150 TABLET ORAL DAILY
Qty: 30 TABLET | Refills: 4 | Status: SHIPPED | OUTPATIENT
Start: 2019-05-21 | End: 2019-05-23

## 2019-05-21 RX ORDER — HYDROXYZINE PAMOATE 25 MG/1
CAPSULE ORAL
Qty: 30 CAPSULE | Refills: 2 | Status: SHIPPED | OUTPATIENT
Start: 2019-05-21

## 2019-05-21 NOTE — PROGRESS NOTES
"Outpatient Psychiatry Initial Visit (MD/NP)    5/21/2019    Ailyn Sapp, a 28 y.o. female, presenting for follow up visit. Last seen 4/10/19 Met with patient.    Reason for Encounter: Referral from Neurology. Patient complains of Anxiety.    Current medications  -Lexapro 20mg daily  -Wellbutrin XL 150mg daily  -Vistaril 25mg PRN panic- has taken just once since last visit  -magnesium supplement  -Krill oil  -B12  -Vit D  -CoQ10        History of Present Illness:   TODAY:  After last visit, pt referred to PCP because of continued LFT elevation. Obtained GI referral but will be moving to [place] in 2-3 weeks so is planning to follow up with GI in [place]. Mood is "great." She had another episode of lower mood and more depressed affect--mother-in-law asked if she was OK. She realized that it correlated exactly with her menstrual cycle this past month. She has realized that she tends to get a lot more irritable, down, depressed and isolative the second half of her cycle. Denies any SI, and the symptoms get better after she menstruates. Does not feel she needs a medication adjustment for this right now, but planning to continue track symptoms with her cycle for another month or two and hoping to follow up about it in [place].  has agreed that, from reading articles on PMDD, it sounds like his wife.     She notes that since starting Wellbutrin she feels more motivated. She is more excited to interact with her son and feels better/more inclined to do activities with him. Her energy is "pretty good" most of the time. More good days than bad days. Says she feels like she had bad insomnia for the initial adjustment period with Wellbutrin but that has now resolved. Sleep cycle is normalizing-- 6-7h night usually, or 1-2 times a week with less than 4h.    Reserving Vistaril for panic attacks, of which she had just one recently, due to travelling. Vistaril helped.     Overall feels her medicines are very helpful. No HI, no AVH, " psychotic sx ever. Overall doing better than she used to be.  is home now. Buying first home in [facility]. Very excited for the move. Had shingles recently but is recovering. Continues having some episodes of cheek flushing, not much different or worse since starting Wellbutrin. Continues following up with Ob/Gyn about her heat intolerance and sweats-- planning to do more work up when she establishes care in [place]. Has been able to lose a few pounds via meal prep.       Review Of Systems:   General:  Denies weight gain, fever, chills, weakness, fatigue  HEENT:  Denies vision changes, sore throat, congestion  CVS:  Denies chest pain, pressure, palpitations a this time  Resp:  Denies shortness of breath, currently  GI:  Denies diarrhea, constipation, abdominal pain, nausea, vomiting  :  Denies dysuria, hematuria, nocturia  MSK:  Denies myalgias, arthralgias  Skin:  Denies rashes, bleeding  Neuro:  Denies headache, dizziness, syncope, numbness, paresthesias  All other systems otherwise negative    Psychiatric ROS-- is patient having problems with:  Sleep: ~6h-7h/sleep per night most nights  Appetite: stable  weight: stable, working to lose  Energy/anergy improved  interest/pleasure/anhedonia: improved. Motivation a lot better  somatic symptoms: no  libido: improved since stopped taking hormone pill  anxiety/panic: yes, but improved, 1 panic only, recently  guilty/hopelessness: no  concentration: better  S.I.B.s/risky behavior: no  Irritability: better  Racing thoughts:better  Impulsive behaviors: no  Paranoia:no  AVH:no      Current Evaluation:     Nutritional Screening: Considering the patient's height and weight, medications, medical history and preferences, should a referral be made to the dietitian? no    Constitutional  Vitals:  Most recent vital signs, dated less than 90 days prior to this appointment, were reviewed.    Vitals:    05/21/19 0856   BP: 137/67   Pulse: 85   Weight: 83.9 kg (184 lb 15.5 oz)  "  Height: 5' 10" (1.778 m)   BP slightly higher than usual   General:  unremarkable, age appropriate     Musculoskeletal  Muscle Strength/Tone:  no spasicity, no tremor   Gait & Station:  non-ataxic     Psychiatric  Speech:  no latency; no press   Mood & Affect:  "great"  congruent and appropriate, pleasant, euthymic appearing   Thought Process:  normal and logical   Associations:  intact   Thought Content:  normal, no suicidality, no homicidality, delusions, or paranoia   Insight:  intact, has awareness of illness   Judgement: behavior is adequate to circumstances   Orientation:  grossly intact   Memory: intact for content of interview   Language: grossly intact   Attention Span & Concentration:  able to focus   Fund of Knowledge:  intact and appropriate to age and level of education       Relevant Elements of Neurological Exam: normal gait    Functioning in Relationships:  Spouse/partner: generally good. He has beenabroad with the [place], came home recently. They have had some marital problems in the past (he texted/sent photos to another woman) but they have been through counseling for this. He is supportive but not physically present right now  Family: finds her own mother has difficulty accepting son's autism diagnosis, but getting better.  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   Latest known visit with results is:   Lab Visit on 04/11/2019   Component Date Value Ref Range Status    Sodium 04/11/2019 136  136 - 145 mmol/L Final    Potassium 04/11/2019 4.2  3.5 - 5.1 mmol/L Final    Chloride 04/11/2019 104  95 - 110 mmol/L Final    CO2 04/11/2019 24  23 - 29 mmol/L Final    Glucose 04/11/2019 92  70 - 110 mg/dL Final    BUN, Bld 04/11/2019 13  6 - 20 mg/dL Final    Creatinine 04/11/2019 0.8  0.5 - 1.4 mg/dL Final    Calcium 04/11/2019 9.7  8.7 - 10.5 mg/dL " Final    Total Protein 04/11/2019 7.7  6.0 - 8.4 g/dL Final    Albumin 04/11/2019 4.2  3.5 - 5.2 g/dL Final    Total Bilirubin 04/11/2019 0.7  0.1 - 1.0 mg/dL Final    Alkaline Phosphatase 04/11/2019 126  55 - 135 U/L Final    AST 04/11/2019 38  10 - 40 U/L Final    ALT 04/11/2019 134* 10 - 44 U/L Final    Anion Gap 04/11/2019 8  8 - 16 mmol/L Final    eGFR if African American 04/11/2019 >60.0  >60 mL/min/1.73 m^2 Final    eGFR if non African American 04/11/2019 >60.0  >60 mL/min/1.73 m^2 Final    WBC 04/11/2019 8.22  3.90 - 12.70 K/uL Final    RBC 04/11/2019 4.66  4.00 - 5.40 M/uL Final    Hemoglobin 04/11/2019 14.3  12.0 - 16.0 g/dL Final    Hematocrit 04/11/2019 42.0  37.0 - 48.5 % Final    Mean Corpuscular Volume 04/11/2019 90  82 - 98 fL Final    Mean Corpuscular Hemoglobin 04/11/2019 30.7  27.0 - 31.0 pg Final    Mean Corpuscular Hemoglobin Conc 04/11/2019 34.0  32.0 - 36.0 g/dL Final    RDW 04/11/2019 12.9  11.5 - 14.5 % Final    Platelets 04/11/2019 309  150 - 350 K/uL Final    MPV 04/11/2019 10.3  9.2 - 12.9 fL Final    Immature Granulocytes 04/11/2019 0.5  0.0 - 0.5 % Final    Gran # (ANC) 04/11/2019 5.3  1.8 - 7.7 K/uL Final    Immature Grans (Abs) 04/11/2019 0.04  0.00 - 0.04 K/uL Final    Lymph # 04/11/2019 2.1  1.0 - 4.8 K/uL Final    Mono # 04/11/2019 0.6  0.3 - 1.0 K/uL Final    Eos # 04/11/2019 0.1  0.0 - 0.5 K/uL Final    Baso # 04/11/2019 0.07  0.00 - 0.20 K/uL Final    nRBC 04/11/2019 0  0 /100 WBC Final    Gran% 04/11/2019 64.9  38.0 - 73.0 % Final    Lymph% 04/11/2019 25.4  18.0 - 48.0 % Final    Mono% 04/11/2019 6.8  4.0 - 15.0 % Final    Eosinophil% 04/11/2019 1.5  0.0 - 8.0 % Final    Basophil% 04/11/2019 0.9  0.0 - 1.9 % Final    Differential Method 04/11/2019 Automated   Final         Medications  Outpatient Encounter Medications as of 5/21/2019   Medication Sig Dispense Refill    amoxicillin-clavulanate 875-125mg (AUGMENTIN) 875-125 mg per tablet Take 1  tablet by mouth 2 (two) times daily. 20 tablet 0    buPROPion (WELLBUTRIN XL) 150 MG TB24 tablet Take 1 tablet (150 mg total) by mouth once daily. 30 tablet 4    dicyclomine (BENTYL) 20 mg tablet Take 1 tablet (20 mg total) by mouth 2 (two) times daily as needed. 30 tablet 11    escitalopram oxalate (LEXAPRO) 20 MG tablet Take 1 tablet (20 mg total) by mouth once daily. 30 tablet 4    fluticasone propionate (FLONASE) 50 mcg/actuation nasal spray Use 2 sprays (100 mcg total) by Each Nare route once daily. 16 g 0    gabapentin (NEURONTIN) 300 MG capsule Take 1 capsule (300 mg total) by mouth 3 (three) times daily. 90 capsule 11    HYDROcodone-acetaminophen (NORCO)  mg per tablet Take 1 tablet by mouth every 6 (six) hours. 20 tablet 0    hydrOXYzine pamoate (VISTARIL) 25 MG Cap Take 1-2 capsules by mouth once daily as needed for panic 30 capsule 2    ondansetron (ZOFRAN-ODT) 4 MG TbDL Take 1 to 2 tablets by mouth every 12 hours as needed for nausea. 120 tablet 2    pantoprazole (PROTONIX) 40 MG tablet Take 1 tablet (40 mg total) by mouth 2 (two) times daily. 60 tablet 11    valACYclovir (VALTREX) 1000 MG tablet Take 1 tablet (1,000 mg total) by mouth 3 (three) times daily with meals. for 7 days 21 tablet 0    [DISCONTINUED] buPROPion (WELLBUTRIN XL) 150 MG TB24 tablet Take 1 tablet (150 mg total) by mouth once daily. 30 tablet 2    [DISCONTINUED] DULoxetine (CYMBALTA) 60 MG capsule Take 1 capsule (60 mg total) by mouth once daily. 30 capsule 11    [DISCONTINUED] escitalopram oxalate (LEXAPRO) 20 MG tablet Take 1 tablet (20 mg total) by mouth once daily. 30 tablet 4    [DISCONTINUED] guanFACINE (TENEX) 1 MG Tab Take 1 tablet (1 mg total) by mouth once daily. 30 tablet 3    [DISCONTINUED] hydrOXYzine pamoate (VISTARIL) 25 MG Cap Take 1-2 capsules by mouth once daily as needed for panic 30 capsule 2     No facility-administered encounter medications on file as of 5/21/2019.            Assessment -  Diagnosis - Goals:     Impression: Ms Ailyn Sapp is a 28 y.o. female with a history of depression, anxiety, and insomnia. anxiety is well controlled but still having breakthrough episodes of depression, most frequently 7-14 days each during second half of menstual cycle, with irritability and numerous depression (also mastalgia and abd cramps).  Insomnia is improved, even without sleep meds. Weight improved. Motivation and energy better on Wellbutrin. Continue as below        ICD-10-CM ICD-9-CM   1. MDD (major depressive disorder), single episode, in full remission F32.5 296.26   2. WINDY (generalized anxiety disorder) F41.1 300.02   3. Social anxiety disorder F40.10 300.23   4. Insomnia due to other mental disorder F51.05 300.9    F99 327.02      R/o PMDD    Strengths and Liabilities: Strength: Patient accepts guidance/feedback, Strength: Patient is expressive/articulate., Strength: Patient is intelligent., Strength: Patient is motivated for change., Strength: Patient is physically healthy., Strength: Patient has positive support network., Strength: Patient has reasonable judgment.    Treatment Plan/Recommendations:   · Medication Management: The risks and benefits of medication were discussed with the patient.  -Continue Lexapro 20mg. Doing well on this, chas with regard to anxiety  -Continue Vistaril 25-50mg daily PRN for panic attacks. Has just needed once  -Continue Wellbutrin XL 150mg daily for antidepressant augmentation. No hx of eating disorders or seizures.    -Follow up with GI in [place] for elevated LFTs (longstanding, predates bupropion)  -Continue tracking menstrual cycle and mood symptoms        -can use OTC melatonin supplement PRN insomnia    Discussed risks and benefits of all medications, including but not limited to sedation, serotonin syndrome, hypotension, HA, GI sx, arrhythmia, others.    Monitoring   -baseline EKG 10/2/18 with sinus val, otherwise normal. QTc 424.   -LFTs very slightly  elevated in Sept 2018, but totally unchanged from 2 years ago. Had recent ultrasound, WNL  -stopped BC pill but aware she must use contraception due to medications  -persistent, slight LFT elevation. Will follow up with GI.   -will get one more set of LFTs within next 1-2 week (ordered today) to ensure no further elevation since starting Wellbutrin. PT agrees. She is ok with being messaged with any results.    Return to Clinic: 2 months, or in [place] ASAP after move. Discussed resident transition in July 2019.       Total time: 30 min

## 2019-05-23 ENCOUNTER — OFFICE VISIT (OUTPATIENT)
Dept: DERMATOLOGY | Facility: CLINIC | Age: 29
End: 2019-05-23
Payer: OTHER GOVERNMENT

## 2019-05-23 DIAGNOSIS — L70.0 ACNE VULGARIS: Primary | ICD-10-CM

## 2019-05-23 DIAGNOSIS — L85.8 KP (KERATOSIS PILARIS): ICD-10-CM

## 2019-05-23 DIAGNOSIS — L21.9 SEBORRHEIC DERMATITIS, UNSPECIFIED: ICD-10-CM

## 2019-05-23 DIAGNOSIS — D22.9 NEVUS: ICD-10-CM

## 2019-05-23 DIAGNOSIS — D48.5 ATYPICAL SPITZ NEVUS: ICD-10-CM

## 2019-05-23 DIAGNOSIS — L81.4 LENTIGO: ICD-10-CM

## 2019-05-23 PROCEDURE — 99203 PR OFFICE/OUTPT VISIT, NEW, LEVL III, 30-44 MIN: ICD-10-PCS | Mod: S$PBB,,, | Performed by: DERMATOLOGY

## 2019-05-23 PROCEDURE — 99212 OFFICE O/P EST SF 10 MIN: CPT | Mod: PBBFAC,PO | Performed by: DERMATOLOGY

## 2019-05-23 PROCEDURE — 99203 OFFICE O/P NEW LOW 30 MIN: CPT | Mod: S$PBB,,, | Performed by: DERMATOLOGY

## 2019-05-23 PROCEDURE — 99999 PR PBB SHADOW E&M-EST. PATIENT-LVL II: ICD-10-PCS | Mod: PBBFAC,,, | Performed by: DERMATOLOGY

## 2019-05-23 PROCEDURE — 99999 PR PBB SHADOW E&M-EST. PATIENT-LVL II: CPT | Mod: PBBFAC,,, | Performed by: DERMATOLOGY

## 2019-05-23 RX ORDER — KETOCONAZOLE 20 MG/ML
SHAMPOO, SUSPENSION TOPICAL
Qty: 120 ML | Refills: 5 | Status: SHIPPED | OUTPATIENT
Start: 2019-05-23

## 2019-05-23 RX ORDER — CLINDAMYCIN PHOSPHATE 11.9 MG/ML
SOLUTION TOPICAL
Qty: 60 ML | Refills: 3 | Status: SHIPPED | OUTPATIENT
Start: 2019-05-23

## 2019-05-23 RX ORDER — BUPROPION HYDROCHLORIDE 75 MG/1
75 TABLET ORAL
COMMUNITY

## 2019-05-23 NOTE — PROGRESS NOTES
"  Subjective:       Patient ID:  Ailyn Sapp is a 28 y.o. female who presents for   Chief Complaint   Patient presents with    Skin Check     High risk pt with fam hx melanoma in brother and MGF and personal hx of atypical spitz nevuson  right upper lateral thigh 1998 here to est. Care and check for the development of new lesions.  Does have a couple of moles she is concerned about on left chest. Present since age 10.  Feels it is more rough and raised. No tx. Not bleeding.   Also has lesion on right breast.  Present for years  Has lesion around rectum. Dark in color.  No tx    Also complains of bumps on back of arms. Raised and occ irritated. No tx.   Also has pimples in hair line and scaling in scalp. No tx. .     Patient is here today for a "mole" check.   Pt has a history of  extensive sun exposure in the past.   Pt recalls several blistering sunburns in the past- 3x  Pt has history of tanning bed use- no  Pt has  had moles removed in the past- r arm- benign, r back- benign but recurring per pt.   Pt has history of melanoma in first degree relatives-  brother        Review of Systems   Constitutional: Negative for fever, chills, weight loss, weight gain, fatigue, night sweats and malaise.   Skin: Positive for daily sunscreen use.   Hematologic/Lymphatic: Does not bruise/bleed easily.        Objective:    Physical Exam   Constitutional: She appears well-developed and well-nourished. No distress.   Genitourinary:         Lymphadenopathy:     She has no inguinal adenopathy.   Neurological: She is alert and oriented to person, place, and time. She is not disoriented.   Psychiatric: She has a normal mood and affect.   Skin:   Areas Examined (abnormalities noted in diagram):   Scalp / Hair Palpated and Inspected  Head / Face Inspection Performed  Neck Inspection Performed  Chest / Axilla Inspection Performed  Abdomen Inspection Performed  Genitals / Buttocks / Groin Inspection Performed  Back Inspection " Performed  RUE Inspected  LUE Inspection Performed  RLE Inspected  LLE Inspection Performed  Nails and Digits Inspection Performed                   Diagram Legend     Erythematous scaling macule/papule c/w actinic keratosis       Vascular papule c/w angioma      Pigmented verrucoid papule/plaque c/w seborrheic keratosis      Yellow umbilicated papule c/w sebaceous hyperplasia      Irregularly shaped tan macule c/w lentigo     1-2 mm smooth white papules consistent with Milia      Movable subcutaneous cyst with punctum c/w epidermal inclusion cyst      Subcutaneous movable cyst c/w pilar cyst      Firm pink to brown papule c/w dermatofibroma      Pedunculated fleshy papule(s) c/w skin tag(s)      Evenly pigmented macule c/w junctional nevus     Mildly variegated pigmented, slightly irregular-bordered macule c/w mildly atypical nevus      Flesh colored to evenly pigmented papule c/w intradermal nevus       Pink pearly papule/plaque c/w basal cell carcinoma      Erythematous hyperkeratotic cursted plaque c/w SCC      Surgical scar with no sign of skin cancer recurrence      Open and closed comedones      Inflammatory papules and pustules      Verrucoid papule consistent consistent with wart     Erythematous eczematous patches and plaques     Dystrophic onycholytic nail with subungual debris c/w onychomycosis     Umbilicated papule    Erythematous-base heme-crusted tan verrucoid plaque consistent with inflamed seborrheic keratosis     Erythematous Silvery Scaling Plaque c/w Psoriasis     See annotation      Assessment / Plan:        Acne vulgaris  -     clindamycin (CLEOCIN T) 1 % external solution; APPLY TO THE AFFECTED AREA TWICE DAILY  Dispense: 60 mL; Refill: 3    Atypical Spitz nevus  Area of previous atypical nevus  examined. Site well healed with no signs of recurrence.    Total body skin examination performed today including at least 12 points as noted in physical examination. No lesions suspicious for  malignancy noted.    Seborrheic dermatitis, unspecified  -     ketoconazole (NIZORAL) 2 % shampoo; Wash hair with medicated shampoo at least 2x/week - let sit on scalp at least 5 minutes prior to rinsing  Dispense: 120 mL; Refill: 5    Lentigo  This is a benign hyperpigmented sun induced lesion. Daily sun protection will reduce the number of new lesions. Treatment of these benign lesions are considered cosmetic.  The nature of sun-induced photo-aging and skin cancers is discussed.  Sun avoidance, protective clothing, and the use of 30-SPF sunscreens is advised. Observe closely for skin damage/changes, and call if such occurs.    Nevus  Discussed ABCDE's of nevi.  Monitor for new mole or moles that are becoming bigger, darker, irritated, or developing irregular borders. Brochure provided.    GABINO (keratosis pilaris)  cerave sa             Follow up in about 1 year (around 5/23/2020).

## 2019-06-04 ENCOUNTER — LAB VISIT (OUTPATIENT)
Dept: LAB | Facility: HOSPITAL | Age: 29
End: 2019-06-04
Payer: OTHER GOVERNMENT

## 2019-06-04 ENCOUNTER — PATIENT MESSAGE (OUTPATIENT)
Dept: PSYCHIATRY | Facility: CLINIC | Age: 29
End: 2019-06-04

## 2019-06-04 DIAGNOSIS — F32.5 MDD (MAJOR DEPRESSIVE DISORDER), SINGLE EPISODE, IN FULL REMISSION: ICD-10-CM

## 2019-06-04 LAB
ALBUMIN SERPL BCP-MCNC: 4 G/DL (ref 3.5–5.2)
ALP SERPL-CCNC: 106 U/L (ref 55–135)
ALT SERPL W/O P-5'-P-CCNC: 116 U/L (ref 10–44)
ANION GAP SERPL CALC-SCNC: 9 MMOL/L (ref 8–16)
AST SERPL-CCNC: 62 U/L (ref 10–40)
BILIRUB SERPL-MCNC: 0.6 MG/DL (ref 0.1–1)
BUN SERPL-MCNC: 13 MG/DL (ref 6–20)
CALCIUM SERPL-MCNC: 10.1 MG/DL (ref 8.7–10.5)
CHLORIDE SERPL-SCNC: 105 MMOL/L (ref 95–110)
CO2 SERPL-SCNC: 26 MMOL/L (ref 23–29)
CREAT SERPL-MCNC: 0.8 MG/DL (ref 0.5–1.4)
EST. GFR  (AFRICAN AMERICAN): >60 ML/MIN/1.73 M^2
EST. GFR  (NON AFRICAN AMERICAN): >60 ML/MIN/1.73 M^2
GLUCOSE SERPL-MCNC: 91 MG/DL (ref 70–110)
POTASSIUM SERPL-SCNC: 3.9 MMOL/L (ref 3.5–5.1)
PROT SERPL-MCNC: 7.7 G/DL (ref 6–8.4)
SODIUM SERPL-SCNC: 140 MMOL/L (ref 136–145)

## 2019-06-04 PROCEDURE — 36415 COLL VENOUS BLD VENIPUNCTURE: CPT

## 2019-06-04 PROCEDURE — 80053 COMPREHEN METABOLIC PANEL: CPT

## 2019-06-09 ENCOUNTER — PATIENT MESSAGE (OUTPATIENT)
Dept: INTERNAL MEDICINE | Facility: CLINIC | Age: 29
End: 2019-06-09

## 2019-06-09 DIAGNOSIS — R79.89 ELEVATED LFTS: Primary | ICD-10-CM

## 2019-06-10 NOTE — TELEPHONE ENCOUNTER
Pt is req a referral to the [place] [facility] for elevated liver enzymes. Pt has moved to [place]. Are we able to do so? Or does she need to est w/ a internist there?

## 2019-06-12 ENCOUNTER — PATIENT MESSAGE (OUTPATIENT)
Dept: PSYCHIATRY | Facility: CLINIC | Age: 29
End: 2019-06-12

## 2019-06-17 ENCOUNTER — PATIENT MESSAGE (OUTPATIENT)
Dept: PSYCHIATRY | Facility: CLINIC | Age: 29
End: 2019-06-17

## 2019-06-17 DIAGNOSIS — F99 INSOMNIA DUE TO OTHER MENTAL DISORDER: ICD-10-CM

## 2019-06-17 DIAGNOSIS — F40.10 SOCIAL ANXIETY DISORDER: ICD-10-CM

## 2019-06-17 DIAGNOSIS — F32.5 MDD (MAJOR DEPRESSIVE DISORDER), SINGLE EPISODE, IN FULL REMISSION: Primary | ICD-10-CM

## 2019-06-17 DIAGNOSIS — F41.1 GAD (GENERALIZED ANXIETY DISORDER): ICD-10-CM

## 2019-06-17 DIAGNOSIS — F51.05 INSOMNIA DUE TO OTHER MENTAL DISORDER: ICD-10-CM

## 2019-07-10 ENCOUNTER — PATIENT MESSAGE (OUTPATIENT)
Dept: PSYCHIATRY | Facility: CLINIC | Age: 29
End: 2019-07-10

## 2020-09-24 ENCOUNTER — APPOINTMENT (RX ONLY)
Dept: URBAN - METROPOLITAN AREA CLINIC 139 | Facility: CLINIC | Age: 30
Setting detail: DERMATOLOGY
End: 2020-09-24

## 2020-09-24 DIAGNOSIS — Z41.9 ENCOUNTER FOR PROCEDURE FOR PURPOSES OTHER THAN REMEDYING HEALTH STATE, UNSPECIFIED: ICD-10-CM

## 2020-09-24 PROCEDURE — ? COSMETIC CONSULTATION: SKIN CARE PRODUCTS AND SERVICES

## 2020-09-24 ASSESSMENT — LOCATION DETAILED DESCRIPTION DERM
LOCATION DETAILED: RIGHT INFERIOR CENTRAL MALAR CHEEK
LOCATION DETAILED: LEFT CENTRAL MALAR CHEEK

## 2020-09-24 ASSESSMENT — LOCATION ZONE DERM: LOCATION ZONE: FACE

## 2020-09-24 ASSESSMENT — LOCATION SIMPLE DESCRIPTION DERM
LOCATION SIMPLE: LEFT CHEEK
LOCATION SIMPLE: RIGHT CHEEK

## 2020-10-22 ENCOUNTER — APPOINTMENT (RX ONLY)
Dept: URBAN - METROPOLITAN AREA CLINIC 139 | Facility: CLINIC | Age: 30
Setting detail: DERMATOLOGY
End: 2020-10-22

## 2020-10-22 DIAGNOSIS — Z41.9 ENCOUNTER FOR PROCEDURE FOR PURPOSES OTHER THAN REMEDYING HEALTH STATE, UNSPECIFIED: ICD-10-CM

## 2020-10-22 PROCEDURE — ? FACIAL

## 2020-10-22 ASSESSMENT — LOCATION ZONE DERM: LOCATION ZONE: FACE

## 2020-10-22 ASSESSMENT — LOCATION SIMPLE DESCRIPTION DERM
LOCATION SIMPLE: RIGHT CHEEK
LOCATION SIMPLE: LEFT CHEEK

## 2020-10-22 ASSESSMENT — LOCATION DETAILED DESCRIPTION DERM
LOCATION DETAILED: RIGHT INFERIOR CENTRAL MALAR CHEEK
LOCATION DETAILED: LEFT INFERIOR CENTRAL MALAR CHEEK

## 2020-10-22 NOTE — PROCEDURE: FACIAL
Mask Type (Optional): collagen
Facial Steaming: steamed
Treatment Type (Optional): Deep Cleanse Treatment
Exfoliation Type: dermaplane
Detail Level: Detailed
Treatment Type Override: Hydrating
Price (Use Numbers Only, No Special Characters Or $): 115

## 2020-12-17 ENCOUNTER — APPOINTMENT (RX ONLY)
Dept: URBAN - METROPOLITAN AREA CLINIC 139 | Facility: CLINIC | Age: 30
Setting detail: DERMATOLOGY
End: 2020-12-17

## 2020-12-17 DIAGNOSIS — Z41.9 ENCOUNTER FOR PROCEDURE FOR PURPOSES OTHER THAN REMEDYING HEALTH STATE, UNSPECIFIED: ICD-10-CM

## 2020-12-17 PROCEDURE — ? FACIAL

## 2020-12-17 ASSESSMENT — LOCATION SIMPLE DESCRIPTION DERM
LOCATION SIMPLE: LEFT CHEEK
LOCATION SIMPLE: RIGHT CHEEK

## 2020-12-17 ASSESSMENT — LOCATION ZONE DERM: LOCATION ZONE: FACE

## 2021-02-04 ENCOUNTER — APPOINTMENT (RX ONLY)
Dept: URBAN - METROPOLITAN AREA CLINIC 139 | Facility: CLINIC | Age: 31
Setting detail: DERMATOLOGY
End: 2021-02-04

## 2021-02-04 DIAGNOSIS — Z12.83 ENCOUNTER FOR SCREENING FOR MALIGNANT NEOPLASM OF SKIN: ICD-10-CM

## 2021-02-04 DIAGNOSIS — L82.1 OTHER SEBORRHEIC KERATOSIS: ICD-10-CM

## 2021-02-04 DIAGNOSIS — Z87.2 PERSONAL HISTORY OF DISEASES OF THE SKIN AND SUBCUTANEOUS TISSUE: ICD-10-CM

## 2021-02-04 DIAGNOSIS — D22 MELANOCYTIC NEVI: ICD-10-CM

## 2021-02-04 PROBLEM — D22.5 MELANOCYTIC NEVI OF TRUNK: Status: ACTIVE | Noted: 2021-02-04

## 2021-02-04 PROCEDURE — ? COUNSELING

## 2021-02-04 PROCEDURE — ? OTHER

## 2021-02-04 PROCEDURE — 99213 OFFICE O/P EST LOW 20 MIN: CPT

## 2021-02-04 ASSESSMENT — LOCATION ZONE DERM
LOCATION ZONE: FACE
LOCATION ZONE: TRUNK

## 2021-02-04 ASSESSMENT — LOCATION DETAILED DESCRIPTION DERM
LOCATION DETAILED: RIGHT SUPERIOR CENTRAL MALAR CHEEK
LOCATION DETAILED: LEFT MEDIAL UPPER BACK

## 2021-02-04 ASSESSMENT — LOCATION SIMPLE DESCRIPTION DERM
LOCATION SIMPLE: RIGHT CHEEK
LOCATION SIMPLE: LEFT UPPER BACK

## 2021-02-04 ASSESSMENT — PAIN INTENSITY VAS: HOW INTENSE IS YOUR PAIN 0 BEING NO PAIN, 10 BEING THE MOST SEVERE PAIN POSSIBLE?: NO PAIN

## 2021-02-04 NOTE — PROCEDURE: OTHER
Note Text (......Xxx Chief Complaint.): This diagnosis correlates with the
Render Risk Assessment In Note?: no
Other (Free Text): 1 lesion located under the right eye was treated with LN2 at 1 freeze thaw cycle.
Detail Level: Simple

## 2021-03-04 ENCOUNTER — APPOINTMENT (RX ONLY)
Dept: URBAN - METROPOLITAN AREA CLINIC 139 | Facility: CLINIC | Age: 31
Setting detail: DERMATOLOGY
End: 2021-03-04

## 2021-03-04 DIAGNOSIS — Z41.9 ENCOUNTER FOR PROCEDURE FOR PURPOSES OTHER THAN REMEDYING HEALTH STATE, UNSPECIFIED: ICD-10-CM

## 2021-03-04 PROCEDURE — ? FACIAL

## 2021-03-04 ASSESSMENT — LOCATION ZONE DERM: LOCATION ZONE: FACE

## 2021-03-04 ASSESSMENT — LOCATION SIMPLE DESCRIPTION DERM
LOCATION SIMPLE: RIGHT CHEEK
LOCATION SIMPLE: LEFT CHEEK

## 2021-04-22 ENCOUNTER — APPOINTMENT (RX ONLY)
Dept: URBAN - METROPOLITAN AREA CLINIC 139 | Facility: CLINIC | Age: 31
Setting detail: DERMATOLOGY
End: 2021-04-22

## 2021-04-22 DIAGNOSIS — Z41.9 ENCOUNTER FOR PROCEDURE FOR PURPOSES OTHER THAN REMEDYING HEALTH STATE, UNSPECIFIED: ICD-10-CM

## 2021-04-22 PROCEDURE — ? FACIAL

## 2021-04-22 ASSESSMENT — LOCATION SIMPLE DESCRIPTION DERM
LOCATION SIMPLE: RIGHT CHEEK
LOCATION SIMPLE: LEFT CHEEK

## 2021-04-22 ASSESSMENT — LOCATION ZONE DERM: LOCATION ZONE: FACE

## 2021-05-06 ENCOUNTER — PATIENT MESSAGE (OUTPATIENT)
Dept: RESEARCH | Facility: HOSPITAL | Age: 31
End: 2021-05-06

## 2021-08-12 ENCOUNTER — APPOINTMENT (RX ONLY)
Dept: URBAN - METROPOLITAN AREA CLINIC 139 | Facility: CLINIC | Age: 31
Setting detail: DERMATOLOGY
End: 2021-08-12

## 2022-11-22 ENCOUNTER — OFFICE VISIT (OUTPATIENT)
Dept: URGENT CARE | Facility: CLINIC | Age: 32
End: 2022-11-22
Payer: OTHER GOVERNMENT

## 2022-11-22 VITALS
HEIGHT: 71 IN | WEIGHT: 185 LBS | SYSTOLIC BLOOD PRESSURE: 107 MMHG | RESPIRATION RATE: 18 BRPM | HEART RATE: 92 BPM | TEMPERATURE: 98 F | OXYGEN SATURATION: 98 % | DIASTOLIC BLOOD PRESSURE: 64 MMHG | BODY MASS INDEX: 25.9 KG/M2

## 2022-11-22 DIAGNOSIS — U07.1 COVID: Primary | ICD-10-CM

## 2022-11-22 LAB
CTP QC/QA: YES
SARS-COV-2 RDRP RESP QL NAA+PROBE: POSITIVE

## 2022-11-22 PROCEDURE — 87635: ICD-10-PCS | Mod: QW,S$GLB,, | Performed by: NURSE PRACTITIONER

## 2022-11-22 PROCEDURE — 99203 OFFICE O/P NEW LOW 30 MIN: CPT | Mod: S$GLB,,, | Performed by: NURSE PRACTITIONER

## 2022-11-22 PROCEDURE — 87635 SARS-COV-2 COVID-19 AMP PRB: CPT | Mod: QW,S$GLB,, | Performed by: NURSE PRACTITIONER

## 2022-11-22 PROCEDURE — 99203 PR OFFICE/OUTPT VISIT, NEW, LEVL III, 30-44 MIN: ICD-10-PCS | Mod: S$GLB,,, | Performed by: NURSE PRACTITIONER

## 2022-11-22 NOTE — PROGRESS NOTES
"Subjective:       Patient ID: Ailyn Sapp is a 32 y.o. female.    Vitals:  height is 5' 11" (1.803 m) and weight is 83.9 kg (185 lb). Her oral temperature is 98.2 °F (36.8 °C). Her blood pressure is 107/64 and her pulse is 92. Her respiration is 18 and oxygen saturation is 98%.     Chief Complaint: URI    Pt complains of cough, congestion, headaches. Symptoms started Saturday and have worsened. Pt tested for covid at home this morning and results were POSITIVE     URI   This is a new problem. The current episode started in the past 7 days (4 days ago). The problem has been gradually worsening. The maximum temperature recorded prior to her arrival was 100.4 - 100.9 F. Associated symptoms include congestion, coughing, headaches, sinus pain and sneezing. Pertinent negatives include no abdominal pain, chest pain, diarrhea, dysuria, ear pain, joint pain, joint swelling, nausea, neck pain, plugged ear sensation, rash, rhinorrhea, sore throat, swollen glands, vomiting or wheezing. Treatments tried: tylenol pm, advil. The treatment provided mild relief.     HENT:  Positive for congestion and sinus pain. Negative for ear pain and sore throat.    Neck: Negative for neck pain.   Cardiovascular:  Negative for chest pain.   Respiratory:  Positive for cough. Negative for wheezing.    Gastrointestinal:  Negative for abdominal pain, nausea, vomiting and diarrhea.   Genitourinary:  Negative for dysuria.   Skin:  Negative for rash.   Allergic/Immunologic: Positive for sneezing.   Neurological:  Positive for headaches.     Objective:      Physical Exam   Constitutional: She is oriented to person, place, and time. She appears well-developed. She is cooperative.  Non-toxic appearance. She does not appear ill. No distress.   HENT:   Head: Normocephalic.   Ears:   Right Ear: Hearing, tympanic membrane, external ear and ear canal normal.   Left Ear: Hearing, tympanic membrane, external ear and ear canal normal.   Nose: Congestion " present. No mucosal edema, rhinorrhea or nasal deformity. No epistaxis. Right sinus exhibits no maxillary sinus tenderness and no frontal sinus tenderness. Left sinus exhibits no maxillary sinus tenderness and no frontal sinus tenderness.   Mouth/Throat: Uvula is midline and mucous membranes are normal. Mucous membranes are moist. No trismus in the jaw. Normal dentition. No uvula swelling. Posterior oropharyngeal erythema present. No oropharyngeal exudate or posterior oropharyngeal edema. Oropharynx is clear.   Eyes: Lids are normal. Right eye exhibits no discharge. Left eye exhibits no discharge. No scleral icterus.   Neck: Trachea normal and phonation normal. Neck supple. No edema present. No erythema present. No neck rigidity present.   Cardiovascular: Normal rate, regular rhythm and normal heart sounds.   Pulmonary/Chest: Effort normal and breath sounds normal. No respiratory distress. She has no decreased breath sounds. She has no rhonchi.   Abdominal: Normal appearance.   Musculoskeletal: Normal range of motion.         General: No deformity. Normal range of motion.   Neurological: She is alert and oriented to person, place, and time. She exhibits normal muscle tone. Coordination normal.   Skin: Skin is warm, dry, intact, not diaphoretic and not pale. Capillary refill takes less than 2 seconds.   Psychiatric: Her speech is normal and behavior is normal. Judgment and thought content normal.   Nursing note and vitals reviewed.      Results for orders placed or performed in visit on 11/22/22   POCT COVID-19 Rapid Screening   Result Value Ref Range    POC Rapid COVID Positive (A) Negative     Acceptable Yes       Assessment:       1. COVID          Plan:         COVID  -     POCT COVID-19 Rapid Screening  -     nirmatrelvir-ritonavir 300 mg (150 mg x 2)-100 mg copackaged tablets (EUA); Take 3 tablets by mouth 2 (two) times daily for 5 days. Each dose contains 2 nirmatrelvir (pink tablets) and 1  ritonavir (white tablet). Take all 3 tablets together  Dispense: 30 tablet; Refill: 0       Patient Instructions   Oral fluids  Rest  Steam (hot showers, hot tea)  Blow nose often  Avoid circulating air (such as ceiling fans) dries your airway  Avoid drinking cold drinks (worsens cough)  Therapeutic coughing to expel mucous  Sit in upright position often          0 : covid risk score is 0